# Patient Record
Sex: MALE | Race: BLACK OR AFRICAN AMERICAN | NOT HISPANIC OR LATINO | Employment: OTHER | URBAN - METROPOLITAN AREA
[De-identification: names, ages, dates, MRNs, and addresses within clinical notes are randomized per-mention and may not be internally consistent; named-entity substitution may affect disease eponyms.]

---

## 2022-11-05 ENCOUNTER — HOSPITAL ENCOUNTER (EMERGENCY)
Facility: OTHER | Age: 67
Discharge: HOME | End: 2022-11-05
Attending: EMERGENCY MEDICINE
Payer: MEDICARE

## 2022-11-05 VITALS
TEMPERATURE: 99 F | HEART RATE: 70 BPM | DIASTOLIC BLOOD PRESSURE: 80 MMHG | RESPIRATION RATE: 16 BRPM | OXYGEN SATURATION: 100 % | SYSTOLIC BLOOD PRESSURE: 168 MMHG

## 2022-11-05 DIAGNOSIS — N20.0 KIDNEY STONE: Primary | ICD-10-CM

## 2022-11-05 DIAGNOSIS — I10 HYPERTENSION, UNSPECIFIED TYPE: ICD-10-CM

## 2022-11-05 DIAGNOSIS — R10.9 FLANK PAIN: ICD-10-CM

## 2022-11-05 DIAGNOSIS — K76.0 FATTY LIVER: ICD-10-CM

## 2022-11-05 DIAGNOSIS — K40.90 INGUINAL HERNIA WITHOUT OBSTRUCTION OR GANGRENE, RECURRENCE NOT SPECIFIED, UNSPECIFIED LATERALITY: ICD-10-CM

## 2022-11-05 DIAGNOSIS — N28.1 RENAL CYST: ICD-10-CM

## 2022-11-05 LAB
ALBUMIN SERPL BCP-MCNC: 3.7 G/DL (ref 3.5–5.2)
ALP SERPL-CCNC: 135 U/L (ref 55–135)
ALT SERPL W/O P-5'-P-CCNC: 29 U/L (ref 10–44)
ANION GAP SERPL CALC-SCNC: 7 MMOL/L (ref 8–16)
AST SERPL-CCNC: 32 U/L (ref 10–40)
BASOPHILS # BLD AUTO: 0.05 K/UL (ref 0–0.2)
BASOPHILS NFR BLD: 0.6 % (ref 0–1.9)
BILIRUB SERPL-MCNC: 0.5 MG/DL (ref 0.1–1)
BILIRUB UR QL STRIP: NEGATIVE
BUN SERPL-MCNC: 13 MG/DL (ref 8–23)
CALCIUM SERPL-MCNC: 9.6 MG/DL (ref 8.7–10.5)
CHLORIDE SERPL-SCNC: 113 MMOL/L (ref 95–110)
CLARITY UR: CLEAR
CO2 SERPL-SCNC: 19 MMOL/L (ref 23–29)
COLOR UR: YELLOW
CREAT SERPL-MCNC: 1.4 MG/DL (ref 0.5–1.4)
DIFFERENTIAL METHOD: ABNORMAL
EOSINOPHIL # BLD AUTO: 0.1 K/UL (ref 0–0.5)
EOSINOPHIL NFR BLD: 0.7 % (ref 0–8)
ERYTHROCYTE [DISTWIDTH] IN BLOOD BY AUTOMATED COUNT: 14.5 % (ref 11.5–14.5)
EST. GFR  (NO RACE VARIABLE): 55 ML/MIN/1.73 M^2
GLUCOSE SERPL-MCNC: 94 MG/DL (ref 70–110)
GLUCOSE UR QL STRIP: NEGATIVE
HCT VFR BLD AUTO: 39.8 % (ref 40–54)
HCV AB SERPL QL IA: NEGATIVE
HGB BLD-MCNC: 13.1 G/DL (ref 14–18)
HGB UR QL STRIP: ABNORMAL
HIV 1+2 AB+HIV1 P24 AG SERPL QL IA: NEGATIVE
IMM GRANULOCYTES # BLD AUTO: 0.02 K/UL (ref 0–0.04)
IMM GRANULOCYTES NFR BLD AUTO: 0.2 % (ref 0–0.5)
KETONES UR QL STRIP: ABNORMAL
LEUKOCYTE ESTERASE UR QL STRIP: NEGATIVE
LYMPHOCYTES # BLD AUTO: 1.2 K/UL (ref 1–4.8)
LYMPHOCYTES NFR BLD: 14.7 % (ref 18–48)
MCH RBC QN AUTO: 30.2 PG (ref 27–31)
MCHC RBC AUTO-ENTMCNC: 32.9 G/DL (ref 32–36)
MCV RBC AUTO: 92 FL (ref 82–98)
MICROSCOPIC COMMENT: ABNORMAL
MONOCYTES # BLD AUTO: 0.5 K/UL (ref 0.3–1)
MONOCYTES NFR BLD: 6.2 % (ref 4–15)
NEUTROPHILS # BLD AUTO: 6.3 K/UL (ref 1.8–7.7)
NEUTROPHILS NFR BLD: 77.6 % (ref 38–73)
NITRITE UR QL STRIP: NEGATIVE
NRBC BLD-RTO: 0 /100 WBC
PH UR STRIP: 7 [PH] (ref 5–8)
PLATELET # BLD AUTO: 198 K/UL (ref 150–450)
PMV BLD AUTO: 9.8 FL (ref 9.2–12.9)
POTASSIUM SERPL-SCNC: 4.1 MMOL/L (ref 3.5–5.1)
PROT SERPL-MCNC: 7 G/DL (ref 6–8.4)
PROT UR QL STRIP: ABNORMAL
RBC # BLD AUTO: 4.34 M/UL (ref 4.6–6.2)
RBC #/AREA URNS HPF: 50 /HPF (ref 0–4)
SODIUM SERPL-SCNC: 139 MMOL/L (ref 136–145)
SP GR UR STRIP: 1.02 (ref 1–1.03)
URN SPEC COLLECT METH UR: ABNORMAL
UROBILINOGEN UR STRIP-ACNC: NEGATIVE EU/DL
WBC # BLD AUTO: 8.09 K/UL (ref 3.9–12.7)

## 2022-11-05 PROCEDURE — 81000 URINALYSIS NONAUTO W/SCOPE: CPT | Performed by: PHYSICIAN ASSISTANT

## 2022-11-05 PROCEDURE — 96375 TX/PRO/DX INJ NEW DRUG ADDON: CPT

## 2022-11-05 PROCEDURE — 63600175 PHARM REV CODE 636 W HCPCS: Performed by: PHYSICIAN ASSISTANT

## 2022-11-05 PROCEDURE — 25000003 PHARM REV CODE 250: Performed by: PHYSICIAN ASSISTANT

## 2022-11-05 PROCEDURE — 85025 COMPLETE CBC W/AUTO DIFF WBC: CPT | Performed by: PHYSICIAN ASSISTANT

## 2022-11-05 PROCEDURE — 99285 EMERGENCY DEPT VISIT HI MDM: CPT | Mod: 25

## 2022-11-05 PROCEDURE — 87389 HIV-1 AG W/HIV-1&-2 AB AG IA: CPT | Performed by: EMERGENCY MEDICINE

## 2022-11-05 PROCEDURE — 96361 HYDRATE IV INFUSION ADD-ON: CPT

## 2022-11-05 PROCEDURE — 80053 COMPREHEN METABOLIC PANEL: CPT | Performed by: EMERGENCY MEDICINE

## 2022-11-05 PROCEDURE — 96376 TX/PRO/DX INJ SAME DRUG ADON: CPT

## 2022-11-05 PROCEDURE — 96374 THER/PROPH/DIAG INJ IV PUSH: CPT

## 2022-11-05 PROCEDURE — 86803 HEPATITIS C AB TEST: CPT | Performed by: EMERGENCY MEDICINE

## 2022-11-05 RX ORDER — TAMSULOSIN HYDROCHLORIDE 0.4 MG/1
0.4 CAPSULE ORAL DAILY
Qty: 7 CAPSULE | Refills: 0 | Status: SHIPPED | OUTPATIENT
Start: 2022-11-05 | End: 2022-11-12

## 2022-11-05 RX ORDER — MORPHINE SULFATE 4 MG/ML
4 INJECTION, SOLUTION INTRAMUSCULAR; INTRAVENOUS
Status: COMPLETED | OUTPATIENT
Start: 2022-11-05 | End: 2022-11-05

## 2022-11-05 RX ORDER — TAMSULOSIN HYDROCHLORIDE 0.4 MG/1
0.4 CAPSULE ORAL
Status: COMPLETED | OUTPATIENT
Start: 2022-11-05 | End: 2022-11-05

## 2022-11-05 RX ORDER — ACETAMINOPHEN 325 MG/1
650 TABLET ORAL
Status: COMPLETED | OUTPATIENT
Start: 2022-11-05 | End: 2022-11-05

## 2022-11-05 RX ORDER — NAPROXEN 375 MG/1
375 TABLET ORAL 2 TIMES DAILY WITH MEALS
Qty: 14 TABLET | Refills: 0 | Status: SHIPPED | OUTPATIENT
Start: 2022-11-05

## 2022-11-05 RX ORDER — KETOROLAC TROMETHAMINE 30 MG/ML
10 INJECTION, SOLUTION INTRAMUSCULAR; INTRAVENOUS
Status: COMPLETED | OUTPATIENT
Start: 2022-11-05 | End: 2022-11-05

## 2022-11-05 RX ORDER — LIDOCAINE 50 MG/G
1 PATCH TOPICAL
Status: DISCONTINUED | OUTPATIENT
Start: 2022-11-05 | End: 2022-11-05 | Stop reason: HOSPADM

## 2022-11-05 RX ORDER — LIDOCAINE 50 MG/G
1 PATCH TOPICAL DAILY
Qty: 10 PATCH | Refills: 0 | Status: SHIPPED | OUTPATIENT
Start: 2022-11-05

## 2022-11-05 RX ORDER — HYDROCODONE BITARTRATE AND ACETAMINOPHEN 7.5; 325 MG/1; MG/1
1 TABLET ORAL
Status: COMPLETED | OUTPATIENT
Start: 2022-11-05 | End: 2022-11-05

## 2022-11-05 RX ORDER — ONDANSETRON 4 MG/1
4 TABLET, ORALLY DISINTEGRATING ORAL EVERY 6 HOURS PRN
Qty: 16 TABLET | Refills: 0 | Status: SHIPPED | OUTPATIENT
Start: 2022-11-05

## 2022-11-05 RX ORDER — ACETAMINOPHEN 500 MG
1000 TABLET ORAL
Status: DISCONTINUED | OUTPATIENT
Start: 2022-11-05 | End: 2022-11-05

## 2022-11-05 RX ORDER — HYDROCODONE BITARTRATE AND ACETAMINOPHEN 7.5; 325 MG/1; MG/1
1 TABLET ORAL EVERY 4 HOURS PRN
Qty: 18 TABLET | Refills: 0 | Status: SHIPPED | OUTPATIENT
Start: 2022-11-05 | End: 2022-11-08

## 2022-11-05 RX ADMIN — HYDROCODONE BITARTRATE AND ACETAMINOPHEN 1 TABLET: 7.5; 325 TABLET ORAL at 07:11

## 2022-11-05 RX ADMIN — SODIUM CHLORIDE 1000 ML: 0.9 INJECTION, SOLUTION INTRAVENOUS at 04:11

## 2022-11-05 RX ADMIN — ACETAMINOPHEN 650 MG: 325 TABLET, FILM COATED ORAL at 07:11

## 2022-11-05 RX ADMIN — MORPHINE SULFATE 4 MG: 4 INJECTION, SOLUTION INTRAMUSCULAR; INTRAVENOUS at 07:11

## 2022-11-05 RX ADMIN — LIDOCAINE 1 PATCH: 50 PATCH TOPICAL at 07:11

## 2022-11-05 RX ADMIN — KETOROLAC TROMETHAMINE 10 MG: 30 INJECTION, SOLUTION INTRAMUSCULAR; INTRAVENOUS at 07:11

## 2022-11-05 RX ADMIN — TAMSULOSIN HYDROCHLORIDE 0.4 MG: 0.4 CAPSULE ORAL at 07:11

## 2022-11-05 RX ADMIN — MORPHINE SULFATE 4 MG: 4 INJECTION, SOLUTION INTRAMUSCULAR; INTRAVENOUS at 04:11

## 2022-11-05 NOTE — ED TRIAGE NOTES
"68 yo male reports to ed with co RUQ abd pain radiating to R flank area. When asked when this started the pt stated "20 years ago". Reports N/V this am. Aaox4.   "
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
## 2022-11-06 NOTE — DISCHARGE INSTRUCTIONS
Use all medication as prescribed (Add 500mg tylenol every 6 hours if needed for increased pain control- do not take more tylenol than this recommendation), urology clinic will call you to schedule  Drink plenty of clear fluids   Return to ER for uncontrollable vomiting, severe pain despite pain meds or fever

## 2022-11-15 ENCOUNTER — TELEPHONE (OUTPATIENT)
Dept: UROLOGY | Facility: CLINIC | Age: 67
End: 2022-11-15
Payer: MEDICAID

## 2022-11-15 ENCOUNTER — NURSE TRIAGE (OUTPATIENT)
Dept: ADMINISTRATIVE | Facility: CLINIC | Age: 67
End: 2022-11-15
Payer: MEDICAID

## 2022-11-15 NOTE — TELEPHONE ENCOUNTER
Call placed to number listed for patient. Phone line gave a busy signal. Patient has not been seen by this provider and medications cannot be prescribed without a prior establishing clinic encounter.

## 2022-11-15 NOTE — TELEPHONE ENCOUNTER
Pt states out of rx, last dose on Saturday, for kidney stone. Pt asking to refill medications, states he has not passed the stone. Per pt chart, will not see urology until January 4. Advised per care advice discuss with pcp and callback by nurse today. Discussed with pt that if symptoms change or worsen to call back or for any further questions or concerns. Pt verbalizes understanding.   Reason for Disposition   Prescription refill request for NON-ESSENTIAL medicine (i.e., no harm to patient if med not taken) and triager unable to refill per department policy    Protocols used: Medication Refill and Renewal Call-A-OH

## 2022-11-15 NOTE — TELEPHONE ENCOUNTER
----- Message from Shiloh Fuchs sent at 11/15/2022  4:32 PM CST -----  Name of Who is Calling:TONY HOU [[id number]]              What is the request in detail:Requesting a call back regarding medication.               Can the clinic reply by [facility]:no              [facility][phone]

## 2023-09-07 ENCOUNTER — TELEPHONE (OUTPATIENT)
Dept: SPINE | Facility: CLINIC | Age: 68
End: 2023-09-07
Payer: MEDICARE

## 2023-09-07 NOTE — TELEPHONE ENCOUNTER
----- Message from Fernanda Holt sent at 9/7/2023  4:46 PM CDT -----  Regarding: self [phone]    Type: Patient Call Back       Who called: Patient       What is the request in detail: Patient would like to know why the provider canceled his appt. Patient states he has medicaid and peoples health insurance. Thank you       Can the clinic reply by [facility]? no       Would the patient rather a call back or a response via [facility]? Call back       Best call back number: [phone]       Additional Information:

## 2023-09-07 NOTE — TELEPHONE ENCOUNTER
Another member of staff called and left patient a message informing him of the cancellation and the reason as to why.

## 2023-10-06 ENCOUNTER — OFFICE VISIT (OUTPATIENT)
Dept: PAIN MEDICINE | Facility: CLINIC | Age: 68
End: 2023-10-06
Payer: MEDICARE

## 2023-10-06 VITALS
RESPIRATION RATE: 18 BRPM | TEMPERATURE: 99 F | SYSTOLIC BLOOD PRESSURE: 162 MMHG | HEART RATE: 82 BPM | WEIGHT: 207.69 LBS | OXYGEN SATURATION: 100 % | DIASTOLIC BLOOD PRESSURE: 83 MMHG

## 2023-10-06 DIAGNOSIS — M79.604 LEG PAIN, ANTERIOR, RIGHT: Primary | ICD-10-CM

## 2023-10-06 DIAGNOSIS — F11.90 CHRONIC, CONTINUOUS USE OF OPIOIDS: ICD-10-CM

## 2023-10-06 DIAGNOSIS — M54.16 LUMBAR RADICULOPATHY, CHRONIC: ICD-10-CM

## 2023-10-06 PROCEDURE — 4010F ACE/ARB THERAPY RXD/TAKEN: CPT | Mod: CPTII,S$GLB,, | Performed by: ANESTHESIOLOGY

## 2023-10-06 PROCEDURE — 3288F FALL RISK ASSESSMENT DOCD: CPT | Mod: CPTII,S$GLB,, | Performed by: ANESTHESIOLOGY

## 2023-10-06 PROCEDURE — 1159F MED LIST DOCD IN RCRD: CPT | Mod: CPTII,S$GLB,, | Performed by: ANESTHESIOLOGY

## 2023-10-06 PROCEDURE — 3079F PR MOST RECENT DIASTOLIC BLOOD PRESSURE 80-89 MM HG: ICD-10-PCS | Mod: CPTII,S$GLB,, | Performed by: ANESTHESIOLOGY

## 2023-10-06 PROCEDURE — 1159F PR MEDICATION LIST DOCUMENTED IN MEDICAL RECORD: ICD-10-PCS | Mod: CPTII,S$GLB,, | Performed by: ANESTHESIOLOGY

## 2023-10-06 PROCEDURE — 99999 PR PBB SHADOW E&M-EST. PATIENT-LVL III: ICD-10-PCS | Mod: PBBFAC,,, | Performed by: ANESTHESIOLOGY

## 2023-10-06 PROCEDURE — 99204 OFFICE O/P NEW MOD 45 MIN: CPT | Mod: S$GLB,,, | Performed by: ANESTHESIOLOGY

## 2023-10-06 PROCEDURE — 1100F PTFALLS ASSESS-DOCD GE2>/YR: CPT | Mod: CPTII,S$GLB,, | Performed by: ANESTHESIOLOGY

## 2023-10-06 PROCEDURE — 1125F PR PAIN SEVERITY QUANTIFIED, PAIN PRESENT: ICD-10-PCS | Mod: CPTII,S$GLB,, | Performed by: ANESTHESIOLOGY

## 2023-10-06 PROCEDURE — 3077F SYST BP >= 140 MM HG: CPT | Mod: CPTII,S$GLB,, | Performed by: ANESTHESIOLOGY

## 2023-10-06 PROCEDURE — 4010F PR ACE/ARB THEARPY RXD/TAKEN: ICD-10-PCS | Mod: CPTII,S$GLB,, | Performed by: ANESTHESIOLOGY

## 2023-10-06 PROCEDURE — 1100F PR PT FALLS ASSESS DOC 2+ FALLS/FALL W/INJURY/YR: ICD-10-PCS | Mod: CPTII,S$GLB,, | Performed by: ANESTHESIOLOGY

## 2023-10-06 PROCEDURE — 3077F PR MOST RECENT SYSTOLIC BLOOD PRESSURE >= 140 MM HG: ICD-10-PCS | Mod: CPTII,S$GLB,, | Performed by: ANESTHESIOLOGY

## 2023-10-06 PROCEDURE — 3079F DIAST BP 80-89 MM HG: CPT | Mod: CPTII,S$GLB,, | Performed by: ANESTHESIOLOGY

## 2023-10-06 PROCEDURE — 1125F AMNT PAIN NOTED PAIN PRSNT: CPT | Mod: CPTII,S$GLB,, | Performed by: ANESTHESIOLOGY

## 2023-10-06 PROCEDURE — 3288F PR FALLS RISK ASSESSMENT DOCUMENTED: ICD-10-PCS | Mod: CPTII,S$GLB,, | Performed by: ANESTHESIOLOGY

## 2023-10-06 PROCEDURE — 99999 PR PBB SHADOW E&M-EST. PATIENT-LVL III: CPT | Mod: PBBFAC,,, | Performed by: ANESTHESIOLOGY

## 2023-10-06 PROCEDURE — 99204 PR OFFICE/OUTPT VISIT, NEW, LEVL IV, 45-59 MIN: ICD-10-PCS | Mod: S$GLB,,, | Performed by: ANESTHESIOLOGY

## 2023-10-06 RX ORDER — TRAMADOL HYDROCHLORIDE 50 MG/1
50 TABLET ORAL
Qty: 30 TABLET | Refills: 0 | Status: SHIPPED | OUTPATIENT
Start: 2023-10-06 | End: 2024-01-23 | Stop reason: SDUPTHER

## 2023-10-06 RX ORDER — PREGABALIN 50 MG/1
50 CAPSULE ORAL 2 TIMES DAILY
Qty: 60 CAPSULE | Refills: 2 | Status: SHIPPED | OUTPATIENT
Start: 2023-10-06
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
## 2023-10-20 ENCOUNTER — HOSPITAL ENCOUNTER (OUTPATIENT)
Dept: RADIOLOGY | Facility: OTHER | Age: 68
Discharge: HOME | End: 2023-10-20
Attending: ANESTHESIOLOGY
Payer: MEDICARE

## 2023-10-20 DIAGNOSIS — M54.16 LUMBAR RADICULOPATHY, CHRONIC: ICD-10-CM

## 2023-10-20 DIAGNOSIS — M79.604 LEG PAIN, ANTERIOR, RIGHT: ICD-10-CM

## 2023-10-20 PROCEDURE — 72148 MRI LUMBAR SPINE W/O DYE: CPT | Mod: TC

## 2023-10-20 PROCEDURE — 72148 MRI LUMBAR SPINE W/O DYE: CPT | Mod: 26,,, | Performed by: RADIOLOGY

## 2023-10-20 PROCEDURE — 72148 MRI LUMBAR SPINE WITHOUT CONTRAST: ICD-10-PCS | Mod: 26,,, | Performed by: RADIOLOGY

## 2023-10-20 PROCEDURE — 73562 X-RAY EXAM OF KNEE 3: CPT | Mod: 26,RT,, | Performed by: RADIOLOGY

## 2023-10-20 PROCEDURE — 73562 X-RAY EXAM OF KNEE 3: CPT | Mod: TC,FY,RT

## 2023-10-20 PROCEDURE — 73562 XR KNEE 3 VIEW RIGHT: ICD-10-PCS | Mod: 26,RT,, | Performed by: RADIOLOGY

## 2024-01-23 ENCOUNTER — OFFICE VISIT (OUTPATIENT)
Dept: PAIN MEDICINE | Facility: CLINIC | Age: 69
End: 2024-01-23
Payer: MEDICARE

## 2024-01-23 VITALS
HEART RATE: 82 BPM | SYSTOLIC BLOOD PRESSURE: 155 MMHG | OXYGEN SATURATION: 100 % | HEIGHT: 68 IN | TEMPERATURE: 98 F | DIASTOLIC BLOOD PRESSURE: 100 MMHG | BODY MASS INDEX: 32.41 KG/M2 | RESPIRATION RATE: 18 BRPM | WEIGHT: 213.88 LBS

## 2024-01-23 DIAGNOSIS — M25.512 CHRONIC LEFT SHOULDER PAIN: ICD-10-CM

## 2024-01-23 DIAGNOSIS — M54.2 CERVICALGIA: ICD-10-CM

## 2024-01-23 DIAGNOSIS — Z79.891 ENCOUNTER FOR LONG-TERM OPIATE ANALGESIC USE: ICD-10-CM

## 2024-01-23 DIAGNOSIS — F11.90 CHRONIC, CONTINUOUS USE OF OPIOIDS: ICD-10-CM

## 2024-01-23 DIAGNOSIS — M54.12 CERVICAL RADICULOPATHY: ICD-10-CM

## 2024-01-23 DIAGNOSIS — M54.16 LUMBAR RADICULOPATHY, CHRONIC: Primary | ICD-10-CM

## 2024-01-23 DIAGNOSIS — G89.29 CHRONIC LEFT SHOULDER PAIN: ICD-10-CM

## 2024-01-23 DIAGNOSIS — M79.604 LEG PAIN, ANTERIOR, RIGHT: ICD-10-CM

## 2024-01-23 PROCEDURE — 80347 BENZODIAZEPINES 13 OR MORE: CPT | Performed by: NURSE PRACTITIONER

## 2024-01-23 PROCEDURE — 80326 AMPHETAMINES 5 OR MORE: CPT | Performed by: NURSE PRACTITIONER

## 2024-01-23 PROCEDURE — 99214 OFFICE O/P EST MOD 30 MIN: CPT | Mod: S$GLB,,, | Performed by: NURSE PRACTITIONER

## 2024-01-23 PROCEDURE — 80355 GABAPENTIN NON-BLOOD: CPT | Performed by: NURSE PRACTITIONER

## 2024-01-23 PROCEDURE — 99999 PR PBB SHADOW E&M-EST. PATIENT-LVL IV: CPT | Mod: PBBFAC,,, | Performed by: NURSE PRACTITIONER

## 2024-01-23 RX ORDER — TRAMADOL HYDROCHLORIDE 50 MG/1
50 TABLET ORAL
Qty: 30 TABLET | Refills: 1 | Status: SHIPPED | OUTPATIENT
Start: 2024-01-23 | End: 2024-03-05 | Stop reason: SDUPTHER

## 2024-01-23 RX ORDER — DICLOFENAC SODIUM 10 MG/G
2 GEL TOPICAL DAILY
Qty: 1 EACH | Refills: 2 | Status: SHIPPED | OUTPATIENT
Start: 2024-01-23
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
## 2024-01-28 LAB
6MAM UR QL: NOT DETECTED
7AMINOCLONAZEPAM UR QL: NOT DETECTED
A-OH ALPRAZ UR QL: NOT DETECTED
ALPHA-OH-MIDAZOLAM: NOT DETECTED
ALPRAZ UR QL: NOT DETECTED
AMPHET UR QL SCN: NOT DETECTED
ANNOTATION COMMENT IMP: NORMAL
BARBITURATES UR QL: NEGATIVE
BUPRENORPHINE UR QL: NOT DETECTED
BZE UR QL: NEGATIVE
CARBOXYTHC UR QL: NEGATIVE
CARISOPRODOL UR QL: NEGATIVE
CLONAZEPAM UR QL: NOT DETECTED
CODEINE UR QL: NOT DETECTED
CREAT UR-MCNC: 186.5 MG/DL (ref 20–400)
DIAZEPAM UR QL: NOT DETECTED
ETHYL GLUCURONIDE UR QL: NEGATIVE
FENTANYL UR QL: NOT DETECTED
GABAPENTIN: NOT DETECTED
HYDROCODONE UR QL: NOT DETECTED
HYDROMORPHONE UR QL: NOT DETECTED
LORAZEPAM UR QL: NOT DETECTED
MDA UR QL: NOT DETECTED
MDEA UR QL: NOT DETECTED
MDMA UR QL: NOT DETECTED
ME-PHENIDATE UR QL: NOT DETECTED
METHADONE UR QL: NEGATIVE
METHAMPHET UR QL: NOT DETECTED
MIDAZOLAM UR QL SCN: NOT DETECTED
MORPHINE UR QL: NOT DETECTED
NALOXONE: NOT DETECTED
NORBUPRENORPHINE UR QL CFM: NOT DETECTED
NORDIAZEPAM UR QL: NOT DETECTED
NORFENTANYL UR QL: NOT DETECTED
NORHYDROCODONE UR QL CFM: NOT DETECTED
NORMEPERIDINE UR QL CFM: NOT DETECTED
NOROXYCODONE UR QL CFM: NOT DETECTED
NOROXYMORPHONE UR QL SCN: NOT DETECTED
OXAZEPAM UR QL: NOT DETECTED
OXYCODONE UR QL: NOT DETECTED
OXYMORPHONE UR QL: NOT DETECTED
PATHOLOGY STUDY: NORMAL
PCP UR QL: NEGATIVE
PHENTERMINE UR QL: NOT DETECTED
PREGABALIN: NOT DETECTED
SERVICE CMNT-IMP: NORMAL
TAPENTADOL UR QL SCN: NOT DETECTED
TAPENTADOL UR QL SCN: NOT DETECTED
TEMAZEPAM UR QL: NOT DETECTED
TRAMADOL UR QL: NEGATIVE
ZOLPIDEM METABOLITE: NOT DETECTED
ZOLPIDEM UR QL: NOT DETECTED

## 2024-02-28 ENCOUNTER — TELEPHONE (OUTPATIENT)
Dept: PAIN MEDICINE | Facility: CLINIC | Age: 69
End: 2024-02-28
Payer: MEDICARE

## 2024-02-28 NOTE — TELEPHONE ENCOUNTER
----- Message from Claire Esquivel MA sent at 2/28/2024  2:51 PM CST -----  Regarding: Return Call  Message Type: Return Call           Who Called:TONY HOU [[id number]]              Who Left Message for Patient:Na Mcgee MA                Does the patient know what this is regarding?  no              Best Call Back Number:[phone]               Additional Information: Patient is returning a call.  Please assist.

## 2024-02-28 NOTE — TELEPHONE ENCOUNTER
"----- Message from Arlene Bro sent at 2/27/2024 12:39 PM CST -----  Regarding: Medication  "Type:  Patient Call Back    Who Called:PT    What is the reqeust in detail:Pt requesting call back would like to know if doctor can prescribe him something for his back pain. Please advise    Can the clinic reply by [facility]?no    Best Call Back Number:[phone]      Additional Information:[id number] - [place], [place] - [address]                "

## 2024-03-05 DIAGNOSIS — M79.604 LEG PAIN, ANTERIOR, RIGHT: ICD-10-CM

## 2024-03-05 RX ORDER — TRAMADOL HYDROCHLORIDE 50 MG/1
50 TABLET ORAL
Qty: 30 TABLET | Refills: 1 | Status: CANCELLED | OUTPATIENT
Start: 2024-03-05

## 2024-03-05 NOTE — TELEPHONE ENCOUNTER
Patient requesting refill on tramadol  Last office visit 1/23/2024   shows last refill on 2/23/2024  Patient does have a pain contract on file with [facility]  Patient last UDS 1/23/2024 was consistent with current therapy     CODEINE  Not Detected   Comment: INTERPRETIVE INFORMATION: Codeine, U  Positive Cutoff: 40 ng/mL  Methodology: Mass Spectrometry   MORPHINE  Not Detected   Comment: INTERPRETIVE INFORMATION:Morphine, U  Positive Cutoff: 20 ng/mL  Methodology: Mass Spectrometry   6-ACETYLMORPHINE  Not Detected   Comment: INTERPRETIVE INFORMATION:6-acetylmorphine, U  Positive Cutoff: 20 ng/mL  Methodology: Mass Spectrometry   OXYCODONE  Not Detected   Comment: INTERPRETIVE INFORMATION:Oxycodone, U  Positive Cutoff: 40 ng/mL  Methodology: Mass Spectrometry   NOROYXCODONE  Not Detected   Comment: INTERPRETIVE INFORMATION:Noroxycodone, U  Positive Cutoff: 100 ng/mL  Methodology: Mass Spectrometry   OXYMORPHONE  Not Detected   Comment: INTERPRETIVE INFORMATION:Oxymorphone, U  Positive Cutoff: 40 ng/mL  Methodology: Mass Spectrometry   NOROXYMORPHONE  Not Detected   Comment: INTERPRETIVE INFORMATION:Noroxymorphone, U  Positive Cutoff: 100 ng/mL  Methodology: Mass Spectrometry   HYDROCODONE  Not Detected   Comment: INTERPRETIVE INFORMATION:Hydrocodone, U  Positive Cutoff: 40 ng/mL  Methodology: Mass Spectrometry   NORHYDROCODONE  Not Detected   Comment: INTERPRETIVE INFORMATION:Norhydrocodone, U  Positive Cutoff: 100 ng/mL  Methodology: Mass Spectrometry   HYDROMORPHONE  Not Detected   Comment: INTERPRETIVE INFORMATION:Hydromorphone, U  Positive Cutoff: 20 ng/mL  Methodology: Mass Spectrometry   BUPRENORPHINE  Not Detected   Comment: INTERPRETIVE INFORMATION:Buprenorphine, U  Positive Cutoff: 5 ng/mL  Methodology: Mass Spectrometry   NORUBPRENORPHINE  Not Detected   Comment: INTERPRETIVE INFORMATION:Norbuprenorphine, U  Positive Cutoff: 20 ng/mL  Methodology: Mass Spectrometry    FENTANYL  Not Detected   Comment: INTERPRETIVE INFORMATION:Fentanyl, U  Positive Cutoff: 2 ng/mL  Methodology: Mass Spectrometry   NORFENTANYL  Not Detected   Comment: INTERPRETIVE INFORMATION:Norfentanyl, U  Positive Cutoff: 2 ng/mL  Methodology: Mass Spectrometry   MEPERIDINE METABOLITE  Not Detected   Comment: INTERPRETIVE INFORMATION:Meperidine metabolite, U  Positive Cutoff: 50 ng/mL  Methodology: Mass Spectrometry   TAPENTADOL  Not Detected   Comment: INTERPRETIVE INFORMATION:Tapentadol, U  Positive Cutoff: 100 ng/mL  Methodology: Mass Spectrometry   TAPENTADOL-O-SULF  Not Detected   Comment: INTERPRETIVE INFORMATION:Tapentadol-o-Sulf, U  Positive Cutoff: 200 ng/mL  Methodology: Mass Spectrometry   METHADONE  Negative   Comment: Presumptive negative by immunoassay. Testing by mass  spectrometry is available on request.  INTERPRETIVE INFORMATION: Methadone Screen, U  Positive Cutoff: 150 ng/mL  Methodology: Immunoassay   TRAMADOL  Negative   Comment: Presumptive negative by immunoassay. Testing by mass  spectrometry is available on request.  INTERPRETIVE INFORMATION:Tramadol Screen, U  Positive Cutoff: 100 ng/mL  Methodology: Immunoassay   AMPHETAMINE  Not Detected   Comment: INTERPRETIVE INFORMATION:Amphetamine, U  Positive Cutoff: 50 ng/mL  Methodology: Mass Spectrometry   METHAMPHETAMINE  Not Detected   Comment: INTERPRETIVE INFORMATION:Methamphetamine, U  Positive Cutoff: 200 ng/mL  Methodology: Mass Spectrometry   MDMA- ECSTASY  Not Detected   Comment: INTERPRETIVE INFORMATION:MDMA, U  Positive Cutoff: 200 ng/mL  Methodology: Mass Spectrometry   MDA  Not Detected   Comment: INTERPRETIVE INFORMATION:MDA, U  Positive Cutoff: 200 ng/mL  Methodology: Mass Spectrometry   MDEA- [place]  Not Detected   Comment: INTERPRETIVE INFORMATION:MDEA, U  Positive Cutoff: 200 ng/mL  Methodology: Mass Spectrometry   METHYLPHENIDATE  Not Detected   Comment: INTERPRETIVE INFORMATION:Methylphenidate, U  Positive Cutoff: 100  ng/mL  Methodology: Mass Spectrometry   PHENTERMINE  Not Detected   Comment: INTERPRETIVE INFORMATION:Phentermine, U  Positive Cutoff: 100 ng/mL  Methodology: Mass Spectrometry   BENZOYLECGONINE  Negative   Comment: Presumptive negative by immunoassay. Testing by mass  spectrometry is available on request.  INTERPRETIVE INFORMATION:Cocaine Screen, U  Positive Cutoff: 150 ng/mL  Methodology: Immunoassay   ALPRAZOLAM  Not Detected   Comment: INTERPRETIVE INFORMATION:Alprazolam, U  Positive Cutoff: 40 ng/mL  Methodology: Mass Spectrometry   ALPHA-OH-ALPRAZOLAM  Not Detected   Comment: INTERPRETIVE INFORMATION:Alpha-OH-Alprazolam, U  Positive Cutoff: 20 ng/mL  Methodology: Mass Spectrometry   CLONAZEPAM  Not Detected   Comment: INTERPRETIVE INFORMATION:Clonazepam, U  Positive Cutoff: 20 ng/mL  Methodology: Mass Spectrometry   7-AMINOCLONAZEPAM  Not Detected   Comment: INTERPRETIVE INFORMATION:7-Aminoclonazepam, U  Positive Cutoff: 40 ng/mL  Methodology: Mass Spectrometry   DIAZEPAM  Not Detected   Comment: INTERPRETIVE INFORMATION:Diazepam, U  Positive Cutoff: 50 ng/mL  Methodology: Mass Spectrometry   NORDIAZEPAM  Not Detected   Comment: INTERPRETIVE INFORMATION:Nordiazepam, U  Positive Cutoff: 50 ng/mL  Methodology: Mass Spectrometry   OXAZEPAM  Not Detected   Comment: INTERPRETIVE INFORMATION:Oxazepam, U  Positive Cutoff: 50 ng/mL  Methodology: Mass Spectrometry   TEMAZEPAM  Not Detected   Comment: INTERPRETIVE INFORMATION:Temazepam, U  Positive Cutoff: 50 ng/mL  Methodology: Mass Spectrometry

## 2024-03-06 RX ORDER — TRAMADOL HYDROCHLORIDE 50 MG/1
50 TABLET ORAL
Qty: 30 TABLET | Refills: 1 | Status: SHIPPED | OUTPATIENT
Start: 2024-03-06 | End: 2024-03-27 | Stop reason: SDUPTHER

## 2024-03-27 DIAGNOSIS — M79.604 LEG PAIN, ANTERIOR, RIGHT: ICD-10-CM

## 2024-03-27 RX ORDER — TRAMADOL HYDROCHLORIDE 50 MG/1
50 TABLET ORAL
Qty: 30 TABLET | Refills: 1 | Status: SHIPPED | OUTPATIENT
Start: 2024-03-27 | End: 2024-04-09 | Stop reason: SDUPTHER

## 2024-03-27 NOTE — TELEPHONE ENCOUNTER
Patient requesting refill on tramadol  Last office visit 1/23/2024   shows last refill on 2/23/2024  Patient does have a pain contract on file with [facility]  Patient last UDS 1/23/2024 was consistent with current therapy     CODEINE  Not Detected   Comment: INTERPRETIVE INFORMATION: Codeine, U  Positive Cutoff: 40 ng/mL  Methodology: Mass Spectrometry   MORPHINE  Not Detected   Comment: INTERPRETIVE INFORMATION:Morphine, U  Positive Cutoff: 20 ng/mL  Methodology: Mass Spectrometry   6-ACETYLMORPHINE  Not Detected   Comment: INTERPRETIVE INFORMATION:6-acetylmorphine, U  Positive Cutoff: 20 ng/mL  Methodology: Mass Spectrometry   OXYCODONE  Not Detected   Comment: INTERPRETIVE INFORMATION:Oxycodone, U  Positive Cutoff: 40 ng/mL  Methodology: Mass Spectrometry   NOROYXCODONE  Not Detected   Comment: INTERPRETIVE INFORMATION:Noroxycodone, U  Positive Cutoff: 100 ng/mL  Methodology: Mass Spectrometry   OXYMORPHONE  Not Detected   Comment: INTERPRETIVE INFORMATION:Oxymorphone, U  Positive Cutoff: 40 ng/mL  Methodology: Mass Spectrometry   NOROXYMORPHONE  Not Detected   Comment: INTERPRETIVE INFORMATION:Noroxymorphone, U  Positive Cutoff: 100 ng/mL  Methodology: Mass Spectrometry   HYDROCODONE  Not Detected   Comment: INTERPRETIVE INFORMATION:Hydrocodone, U  Positive Cutoff: 40 ng/mL  Methodology: Mass Spectrometry   NORHYDROCODONE  Not Detected   Comment: INTERPRETIVE INFORMATION:Norhydrocodone, U  Positive Cutoff: 100 ng/mL  Methodology: Mass Spectrometry   HYDROMORPHONE  Not Detected   Comment: INTERPRETIVE INFORMATION:Hydromorphone, U  Positive Cutoff: 20 ng/mL  Methodology: Mass Spectrometry   BUPRENORPHINE  Not Detected   Comment: INTERPRETIVE INFORMATION:Buprenorphine, U  Positive Cutoff: 5 ng/mL  Methodology: Mass Spectrometry   NORUBPRENORPHINE  Not Detected   Comment: INTERPRETIVE INFORMATION:Norbuprenorphine, U  Positive Cutoff: 20 ng/mL  Methodology: Mass Spectrometry    FENTANYL  Not Detected   Comment: INTERPRETIVE INFORMATION:Fentanyl, U  Positive Cutoff: 2 ng/mL  Methodology: Mass Spectrometry   NORFENTANYL  Not Detected   Comment: INTERPRETIVE INFORMATION:Norfentanyl, U  Positive Cutoff: 2 ng/mL  Methodology: Mass Spectrometry   MEPERIDINE METABOLITE  Not Detected   Comment: INTERPRETIVE INFORMATION:Meperidine metabolite, U  Positive Cutoff: 50 ng/mL  Methodology: Mass Spectrometry   TAPENTADOL  Not Detected   Comment: INTERPRETIVE INFORMATION:Tapentadol, U  Positive Cutoff: 100 ng/mL  Methodology: Mass Spectrometry   TAPENTADOL-O-SULF  Not Detected   Comment: INTERPRETIVE INFORMATION:Tapentadol-o-Sulf, U  Positive Cutoff: 200 ng/mL  Methodology: Mass Spectrometry   METHADONE  Negative   Comment: Presumptive negative by immunoassay. Testing by mass  spectrometry is available on request.  INTERPRETIVE INFORMATION: Methadone Screen, U  Positive Cutoff: 150 ng/mL  Methodology: Immunoassay   TRAMADOL  Negative   Comment: Presumptive negative by immunoassay. Testing by mass  spectrometry is available on request.  INTERPRETIVE INFORMATION:Tramadol Screen, U  Positive Cutoff: 100 ng/mL  Methodology: Immunoassay   AMPHETAMINE  Not Detected   Comment: INTERPRETIVE INFORMATION:Amphetamine, U  Positive Cutoff: 50 ng/mL  Methodology: Mass Spectrometry   METHAMPHETAMINE  Not Detected   Comment: INTERPRETIVE INFORMATION:Methamphetamine, U  Positive Cutoff: 200 ng/mL  Methodology: Mass Spectrometry   MDMA- ECSTASY  Not Detected   Comment: INTERPRETIVE INFORMATION:MDMA, U  Positive Cutoff: 200 ng/mL  Methodology: Mass Spectrometry   MDA  Not Detected   Comment: INTERPRETIVE INFORMATION:MDA, U  Positive Cutoff: 200 ng/mL  Methodology: Mass Spectrometry   MDEA- [place]  Not Detected   Comment: INTERPRETIVE INFORMATION:MDEA, U  Positive Cutoff: 200 ng/mL  Methodology: Mass Spectrometry   METHYLPHENIDATE  Not Detected   Comment: INTERPRETIVE INFORMATION:Methylphenidate, U  Positive Cutoff: 100  ng/mL  Methodology: Mass Spectrometry   PHENTERMINE  Not Detected   Comment: INTERPRETIVE INFORMATION:Phentermine, U  Positive Cutoff: 100 ng/mL  Methodology: Mass Spectrometry   BENZOYLECGONINE  Negative   Comment: Presumptive negative by immunoassay. Testing by mass  spectrometry is available on request.  INTERPRETIVE INFORMATION:Cocaine Screen, U  Positive Cutoff: 150 ng/mL  Methodology: Immunoassay   ALPRAZOLAM  Not Detected   Comment: INTERPRETIVE INFORMATION:Alprazolam, U  Positive Cutoff: 40 ng/mL  Methodology: Mass Spectrometry   ALPHA-OH-ALPRAZOLAM  Not Detected   Comment: INTERPRETIVE INFORMATION:Alpha-OH-Alprazolam, U  Positive Cutoff: 20 ng/mL  Methodology: Mass Spectrometry   CLONAZEPAM  Not Detected   Comment: INTERPRETIVE INFORMATION:Clonazepam, U  Positive Cutoff: 20 ng/mL  Methodology: Mass Spectrometry   7-AMINOCLONAZEPAM  Not Detected   Comment: INTERPRETIVE INFORMATION:7-Aminoclonazepam, U  Positive Cutoff: 40 ng/mL  Methodology: Mass Spectrometry   DIAZEPAM  Not Detected   Comment: INTERPRETIVE INFORMATION:Diazepam, U  Positive Cutoff: 50 ng/mL  Methodology: Mass Spectrometry   NORDIAZEPAM  Not Detected   Comment: INTERPRETIVE INFORMATION:Nordiazepam, U  Positive Cutoff: 50 ng/mL  Methodology: Mass Spectrometry   OXAZEPAM  Not Detected   Comment: INTERPRETIVE INFORMATION:Oxazepam, U  Positive Cutoff: 50 ng/mL  Methodology: Mass Spectrometry   TEMAZEPAM  Not Detected   Comment: INTERPRETIVE INFORMATION:Temazepam, U  Positive Cutoff: 50 ng/mL  Methodology: Mass Spectrometry   Lorazepam  Not Detected   Comment: INTERPRETIVE INFORMATION:Lorazepam, U  Positive Cutoff: 60 ng/mL  Methodology: Mass Spectrometry   MIDAZOLAM  Not Detected   Comment: INTERPRETIVE INFORMATION:Midazolam, U  Positive Cutoff: 20 ng/mL  Methodology: Mass Spectrometry   ZOLPIDEM  Not Detected   Comment: INTERPRETIVE INFORMATION:Zolpidem, U  Positive Cutoff: 20 ng/mL  Methodology: Mass Spectrometry   BARBITURATES  Negative    Comment: Presumptive negative by immunoassay. Testing by mass  spectrometry is available on request.  INTERPRETIVE INFORMATION:Barbiturates Screen, U  Positive Cutoff: 200 ng/mL  Methodology: Immunoassay   Creatinine, Urine 20.0 - 400.0 mg/dL 186.5   ETHYL GLUCURONIDE  Negative   Comment: Presumptive negative by immunoassay. Testing by mass  spectrometry is available on request.  INTERPRETIVE INFORMATION:Ethyl Glucuronide Screen, U  Positive Cutoff: 500 ng/mL  Methodology: Immunoassay   MARIJUANA METABOLITE  Negative   Comment: Presumptive negative by immunoassay. Testing by mass  spectrometry is available on request.  INTERPRETIVE INFORMATION: THC (Cannabinoids) Screen, U  Positive Cutoff: 50 ng/mL  Methodology: Immunoassay   PCP  Negative   Comment: Presumptive negative by immunoassay. Testing by mass  spectrometry is available on request.  INTERPRETIVE INFORMATION:Phencyclidine Screen, U  Positive Cutoff: 25 ng/mL  Methodology: Immunoassay   CARISOPRODOL  Negative   Comment: Presumptive negative by immunoassay. Testing by mass  spectrometry is available on request.  INTERPRETIVE INFORMATION: Carisoprodol Screen, U  Positive Cutoff: 100 ng/mL  Methodology: Immunoassay  The carisoprodol immunoassay has cross-reactivity to  carisoprodol and meprobamate.   Naloxone  Not Detected   Comment: INTERPRETIVE INFORMATION:Naloxone, U  Positive Cutoff: 100 ng/mL  Methodology: Mass Spectrometry   Gabapentin  Not Detected   Comment: INTERPRETIVE INFORMATION:Gabapentin, U  Positive Cutoff: 3,000 ng/mL  Methodology: Mass Spectrometry   Pregabalin  Not Detected   Comment: INTERPRETIVE INFORMATION:Pregabalin, U  Positive Cutoff: 3,000 ng/mL  Methodology: Mass Spectrometry   Alpha-OH-Midazolam  Not Detected   Comment: INTERPRETIVE INFORMATION:Alpha-OH-Midazolam, U  Positive Cutoff: 20 ng/mL  Methodology: Mass Spectrometry   Zolpidem Metabolite  Not Detected   Comment: INTERPRETIVE INFORMATION:Zolpidem Metabolite, U  Positive  Cutoff: 100 ng/mL  Methodology: Mass Spectrometry   PAIN MANAGEMENT DRUG PANEL  See Below

## 2024-03-27 NOTE — TELEPHONE ENCOUNTER
----- Message from Bárbara Linares sent at 3/27/2024 11:02 AM CDT -----  Regarding: Refil Request  Who Called:TONY HOU [[id number]]      New Prescription or Refill : Refill      RX Name and Strength:  traMADoL (ULTRAM) 50 mg tablet      Local or Mail Order : Local               Preferred Pharmacy:[facility] #[id number] [address]          Would the patient rather a call back or a response via [facility]?no

## 2024-04-08 ENCOUNTER — OFFICE VISIT (OUTPATIENT)
Dept: PAIN MEDICINE | Facility: CLINIC | Age: 69
End: 2024-04-08
Payer: MEDICARE

## 2024-04-08 VITALS
SYSTOLIC BLOOD PRESSURE: 189 MMHG | DIASTOLIC BLOOD PRESSURE: 87 MMHG | WEIGHT: 218.69 LBS | RESPIRATION RATE: 18 BRPM | OXYGEN SATURATION: 98 % | HEART RATE: 60 BPM | TEMPERATURE: 98 F | BODY MASS INDEX: 33.25 KG/M2

## 2024-04-08 DIAGNOSIS — Z79.891 ENCOUNTER FOR LONG-TERM OPIATE ANALGESIC USE: ICD-10-CM

## 2024-04-08 DIAGNOSIS — M54.12 CERVICAL RADICULOPATHY: ICD-10-CM

## 2024-04-08 DIAGNOSIS — M54.16 LUMBAR RADICULOPATHY, CHRONIC: Primary | ICD-10-CM

## 2024-04-08 DIAGNOSIS — M25.512 CHRONIC LEFT SHOULDER PAIN: ICD-10-CM

## 2024-04-08 DIAGNOSIS — G89.29 CHRONIC LEFT SHOULDER PAIN: ICD-10-CM

## 2024-04-08 PROCEDURE — 1101F PT FALLS ASSESS-DOCD LE1/YR: CPT | Mod: CPTII,S$GLB,, | Performed by: NURSE PRACTITIONER

## 2024-04-08 PROCEDURE — 99213 OFFICE O/P EST LOW 20 MIN: CPT | Mod: S$GLB,,, | Performed by: NURSE PRACTITIONER

## 2024-04-08 PROCEDURE — 3288F FALL RISK ASSESSMENT DOCD: CPT | Mod: CPTII,S$GLB,, | Performed by: NURSE PRACTITIONER

## 2024-04-08 PROCEDURE — 3008F BODY MASS INDEX DOCD: CPT | Mod: CPTII,S$GLB,, | Performed by: NURSE PRACTITIONER

## 2024-04-08 PROCEDURE — 3079F DIAST BP 80-89 MM HG: CPT | Mod: CPTII,S$GLB,, | Performed by: NURSE PRACTITIONER

## 2024-04-08 PROCEDURE — 1125F AMNT PAIN NOTED PAIN PRSNT: CPT | Mod: CPTII,S$GLB,, | Performed by: NURSE PRACTITIONER

## 2024-04-08 PROCEDURE — 4010F ACE/ARB THERAPY RXD/TAKEN: CPT | Mod: CPTII,S$GLB,, | Performed by: NURSE PRACTITIONER

## 2024-04-08 PROCEDURE — 1159F MED LIST DOCD IN RCRD: CPT | Mod: CPTII,S$GLB,, | Performed by: NURSE PRACTITIONER

## 2024-04-08 PROCEDURE — 99999 PR PBB SHADOW E&M-EST. PATIENT-LVL III: CPT | Mod: PBBFAC,,, | Performed by: NURSE PRACTITIONER

## 2024-04-08 PROCEDURE — 1160F RVW MEDS BY RX/DR IN RCRD: CPT | Mod: CPTII,S$GLB,, | Performed by: NURSE PRACTITIONER

## 2024-04-08 PROCEDURE — 3077F SYST BP >= 140 MM HG: CPT | Mod: CPTII,S$GLB,, | Performed by: NURSE PRACTITIONER

## 2024-04-08 RX ORDER — NAPROXEN 500 MG/1
500 TABLET ORAL DAILY PRN
Qty: 30 TABLET | Refills: 0 | Status: SHIPPED | OUTPATIENT
Start: 2024-04-08
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
## 2024-04-09 DIAGNOSIS — M79.604 LEG PAIN, ANTERIOR, RIGHT: ICD-10-CM

## 2024-04-09 RX ORDER — TRAMADOL HYDROCHLORIDE 50 MG/1
50 TABLET ORAL
Qty: 30 TABLET | Refills: 1 | Status: SHIPPED | OUTPATIENT
Start: 2024-04-09

## 2024-04-09 NOTE — TELEPHONE ENCOUNTER
Patient requesting refill on traMADoL (ULTRAM) 50 mg   Last office visit 04/08/24   shows last refill on 02/24/24  Patient does not have a pain contract on file with [facility]  Patient last UDS 01/23/24 was not consistent with current therapy       CODEINE  Not Detected   Comment: INTERPRETIVE INFORMATION: Codeine, U  Positive Cutoff: 40 ng/mL  Methodology: Mass Spectrometry   MORPHINE  Not Detected   Comment: INTERPRETIVE INFORMATION:Morphine, U  Positive Cutoff: 20 ng/mL  Methodology: Mass Spectrometry   6-ACETYLMORPHINE  Not Detected   Comment: INTERPRETIVE INFORMATION:6-acetylmorphine, U  Positive Cutoff: 20 ng/mL  Methodology: Mass Spectrometry   OXYCODONE  Not Detected   Comment: INTERPRETIVE INFORMATION:Oxycodone, U  Positive Cutoff: 40 ng/mL  Methodology: Mass Spectrometry   NOROYXCODONE  Not Detected   Comment: INTERPRETIVE INFORMATION:Noroxycodone, U  Positive Cutoff: 100 ng/mL  Methodology: Mass Spectrometry   OXYMORPHONE  Not Detected   Comment: INTERPRETIVE INFORMATION:Oxymorphone, U  Positive Cutoff: 40 ng/mL  Methodology: Mass Spectrometry   NOROXYMORPHONE  Not Detected   Comment: INTERPRETIVE INFORMATION:Noroxymorphone, U  Positive Cutoff: 100 ng/mL  Methodology: Mass Spectrometry   HYDROCODONE  Not Detected   Comment: INTERPRETIVE INFORMATION:Hydrocodone, U  Positive Cutoff: 40 ng/mL  Methodology: Mass Spectrometry   NORHYDROCODONE  Not Detected   Comment: INTERPRETIVE INFORMATION:Norhydrocodone, U  Positive Cutoff: 100 ng/mL  Methodology: Mass Spectrometry   HYDROMORPHONE  Not Detected   Comment: INTERPRETIVE INFORMATION:Hydromorphone, U  Positive Cutoff: 20 ng/mL  Methodology: Mass Spectrometry   BUPRENORPHINE  Not Detected   Comment: INTERPRETIVE INFORMATION:Buprenorphine, U  Positive Cutoff: 5 ng/mL  Methodology: Mass Spectrometry   NORUBPRENORPHINE  Not Detected   Comment: INTERPRETIVE INFORMATION:Norbuprenorphine, U  Positive Cutoff: 20 ng/mL  Methodology:  Mass Spectrometry   FENTANYL  Not Detected   Comment: INTERPRETIVE INFORMATION:Fentanyl, U  Positive Cutoff: 2 ng/mL  Methodology: Mass Spectrometry   NORFENTANYL  Not Detected   Comment: INTERPRETIVE INFORMATION:Norfentanyl, U  Positive Cutoff: 2 ng/mL  Methodology: Mass Spectrometry   MEPERIDINE METABOLITE  Not Detected   Comment: INTERPRETIVE INFORMATION:Meperidine metabolite, U  Positive Cutoff: 50 ng/mL  Methodology: Mass Spectrometry   TAPENTADOL  Not Detected   Comment: INTERPRETIVE INFORMATION:Tapentadol, U  Positive Cutoff: 100 ng/mL  Methodology: Mass Spectrometry   TAPENTADOL-O-SULF  Not Detected   Comment: INTERPRETIVE INFORMATION:Tapentadol-o-Sulf, U  Positive Cutoff: 200 ng/mL  Methodology: Mass Spectrometry   METHADONE  Negative   Comment: Presumptive negative by immunoassay. Testing by mass  spectrometry is available on request.  INTERPRETIVE INFORMATION: Methadone Screen, U  Positive Cutoff: 150 ng/mL  Methodology: Immunoassay   TRAMADOL  Negative   Comment: Presumptive negative by immunoassay. Testing by mass  spectrometry is available on request.  INTERPRETIVE INFORMATION:Tramadol Screen, U  Positive Cutoff: 100 ng/mL  Methodology: Immunoassay   AMPHETAMINE  Not Detected   Comment: INTERPRETIVE INFORMATION:Amphetamine, U  Positive Cutoff: 50 ng/mL  Methodology: Mass Spectrometry   METHAMPHETAMINE  Not Detected   Comment: INTERPRETIVE INFORMATION:Methamphetamine, U  Positive Cutoff: 200 ng/mL  Methodology: Mass Spectrometry   MDMA- ECSTASY  Not Detected   Comment: INTERPRETIVE INFORMATION:MDMA, U  Positive Cutoff: 200 ng/mL  Methodology: Mass Spectrometry   MDA  Not Detected   Comment: INTERPRETIVE INFORMATION:MDA, U  Positive Cutoff: 200 ng/mL  Methodology: Mass Spectrometry   MDEA- Phuong  Not Detected   Comment: INTERPRETIVE INFORMATION:MDEA, U  Positive Cutoff: 200 ng/mL  Methodology: Mass Spectrometry   METHYLPHENIDATE  Not Detected   Comment: INTERPRETIVE INFORMATION:Methylphenidate, U  Positive  Cutoff: 100 ng/mL  Methodology: Mass Spectrometry   PHENTERMINE  Not Detected   Comment: INTERPRETIVE INFORMATION:Phentermine, U  Positive Cutoff: 100 ng/mL  Methodology: Mass Spectrometry   BENZOYLECGONINE  Negative   Comment: Presumptive negative by immunoassay. Testing by mass  spectrometry is available on request.  INTERPRETIVE INFORMATION:Cocaine Screen, U  Positive Cutoff: 150 ng/mL  Methodology: Immunoassay   ALPRAZOLAM  Not Detected   Comment: INTERPRETIVE INFORMATION:Alprazolam, U  Positive Cutoff: 40 ng/mL  Methodology: Mass Spectrometry   ALPHA-OH-ALPRAZOLAM  Not Detected   Comment: INTERPRETIVE INFORMATION:Alpha-OH-Alprazolam, U  Positive Cutoff: 20 ng/mL  Methodology: Mass Spectrometry   CLONAZEPAM  Not Detected   Comment: INTERPRETIVE INFORMATION:Clonazepam, U  Positive Cutoff: 20 ng/mL  Methodology: Mass Spectrometry   7-AMINOCLONAZEPAM  Not Detected   Comment: INTERPRETIVE INFORMATION:7-Aminoclonazepam, U  Positive Cutoff: 40 ng/mL  Methodology: Mass Spectrometry   DIAZEPAM  Not Detected   Comment: INTERPRETIVE INFORMATION:Diazepam, U  Positive Cutoff: 50 ng/mL  Methodology: Mass Spectrometry   NORDIAZEPAM  Not Detected   Comment: INTERPRETIVE INFORMATION:Nordiazepam, U  Positive Cutoff: 50 ng/mL  Methodology: Mass Spectrometry   OXAZEPAM  Not Detected   Comment: INTERPRETIVE INFORMATION:Oxazepam, U  Positive Cutoff: 50 ng/mL  Methodology: Mass Spectrometry   TEMAZEPAM  Not Detected   Comment: INTERPRETIVE INFORMATION:Temazepam, U  Positive Cutoff: 50 ng/mL  Methodology: Mass Spectrometry   Lorazepam  Not Detected   Comment: INTERPRETIVE INFORMATION:Lorazepam, U  Positive Cutoff: 60 ng/mL  Methodology: Mass Spectrometry   MIDAZOLAM  Not Detected   Comment: INTERPRETIVE INFORMATION:Midazolam, U  Positive Cutoff: 20 ng/mL  Methodology: Mass Spectrometry   ZOLPIDEM  Not Detected   Comment: INTERPRETIVE INFORMATION:Zolpidem, U  Positive Cutoff: 20 ng/mL  Methodology: Mass Spectrometry   BARBITURATES   Negative   Comment: Presumptive negative by immunoassay. Testing by mass  spectrometry is available on request.  INTERPRETIVE INFORMATION:Barbiturates Screen, U  Positive Cutoff: 200 ng/mL  Methodology: Immunoassay   Creatinine, Urine 20.0 - 400.0 mg/dL 186.5   ETHYL GLUCURONIDE  Negative   Comment: Presumptive negative by immunoassay. Testing by mass  spectrometry is available on request.  INTERPRETIVE INFORMATION:Ethyl Glucuronide Screen, U  Positive Cutoff: 500 ng/mL  Methodology: Immunoassay   MARIJUANA METABOLITE  Negative   Comment: Presumptive negative by immunoassay. Testing by mass  spectrometry is available on request.  INTERPRETIVE INFORMATION: THC (Cannabinoids) Screen, U  Positive Cutoff: 50 ng/mL  Methodology: Immunoassay   PCP  Negative   Comment: Presumptive negative by immunoassay. Testing by mass  spectrometry is available on request.  INTERPRETIVE INFORMATION:Phencyclidine Screen, U  Positive Cutoff: 25 ng/mL  Methodology: Immunoassay   CARISOPRODOL  Negative   Comment: Presumptive negative by immunoassay. Testing by mass  spectrometry is available on request.  INTERPRETIVE INFORMATION: Carisoprodol Screen, U  Positive Cutoff: 100 ng/mL  Methodology: Immunoassay  The carisoprodol immunoassay has cross-reactivity to  carisoprodol and meprobamate.   Naloxone  Not Detected   Comment: INTERPRETIVE INFORMATION:Naloxone, U  Positive Cutoff: 100 ng/mL  Methodology: Mass Spectrometry   Gabapentin  Not Detected   Comment: INTERPRETIVE INFORMATION:Gabapentin, U  Positive Cutoff: 3,000 ng/mL  Methodology: Mass Spectrometry   Pregabalin  Not Detected   Comment: INTERPRETIVE INFORMATION:Pregabalin, U  Positive Cutoff: 3,000 ng/mL  Methodology: Mass Spectrometry   Alpha-OH-Midazolam  Not Detected   Comment: INTERPRETIVE INFORMATION:Alpha-OH-Midazolam, U  Positive Cutoff: 20 ng/mL  Methodology: Mass Spectrometry   Zolpidem Metabolite  Not Detected   Comment: INTERPRETIVE INFORMATION:Zolpidem Metabolite,  U  Positive Cutoff: 100 ng/mL  Methodology: Mass Spectrometry   PAIN MANAGEMENT DRUG PANEL  See Below   Comment: Methodology: Qualitative Enzyme Immunoassay and Qualitative  Liquid Chromatography-Tandem Mass Spectrometry,  Quantitative Spectrophotometry  The absence of expected drug(s) and/or drug metabolite(s)  may indicate non-compliance, inappropriate timing of  specimen collection relative to drug administration, poor  drug absorption, diluted/adulterated urine, or limitations  of testing. The concentration must be greater than or equal  to the cutoff to be reported as present.  If specific drug  concentrations are required, contact the laboratory within  two weeks of specimen collection to request quantification  by a second analytical technique. Interpretive questions  should be directed to the laboratory.  Results based on immunoassay detection that do not match  clinical expectations should be  interpreted with caution. Confirmatory testing by mass  spectrometry for immunoassay-based results is available, if  ordered within two weeks of specimen collection. Additional  charges apply.  For medical purposes only; not valid for forensic use.  This test was developed and its performance characteristics  determined by [facility]. It has not been cleared or  approved by the US Food and Drug Administration. This test  was performed in a CLIA certified laboratory and is  intended for clinical purposes.   EER PAIN MGT [place],HIGH RES/EMIT, INTERP  See Note   Comment: Authorized individuals can access the [employer]  Enhanced Report using the following link:    [email or web address]  Performed By: [facility]  [address] [place] [id number]  : Leoncio Banda MD, PhD  CLIA Number: [id number]

## 2024-04-09 NOTE — TELEPHONE ENCOUNTER
----- Message from Lynne Dean sent at 4/9/2024  1:24 PM CDT -----  Regarding: rx refill  Type: RX Refill Request    Who Called:     Pharmacy Name:   [facility] #[id number] - [address] & [address] [address]  Phone: [phone] Fax: [phone]        Refill or New Rx:    RX Name and Strength: traMADoL (ULTRAM) 50 mg tablet    How is the patient currently taking it? (ex. 1XDay):    Is this a 30 day or 90 day RX:    Additional Notes:

## 2024-04-10 ENCOUNTER — TELEPHONE (OUTPATIENT)
Dept: PAIN MEDICINE | Facility: CLINIC | Age: 69
End: 2024-04-10
Payer: MEDICARE

## 2024-04-10 NOTE — TELEPHONE ENCOUNTER
Staff spoke with pt and verbalized to the patient his medication has been sent to his requested pharmacy.      ----- Message from Germania Ahn sent at 4/10/2024  3:04 PM CDT -----  Regarding: refill  Name of caller: phill       What is the requesting detail: Please resend traMADoL (ULTRAM) 50 mg tablet [id number] [address] & [facility]    Pt states pharmacy does not have the Rx on file. Please advise       Can the clinic reply by [facility]:       What number to call back: [phone]

## 2024-04-10 NOTE — TELEPHONE ENCOUNTER
----- Message from Germania Ahn sent at 4/10/2024  3:04 PM CDT -----  Regarding: refill  Name of caller: phill       What is the requesting detail: Please resend traMADoL (ULTRAM) 50 mg tablet to [facility] #[id number] - [address] & [facility] pharmacy does not have the Rx on file. Please advise       Can the clinic reply by [facility]:       What number to call back: [phone]

## 2024-04-10 NOTE — TELEPHONE ENCOUNTER
----- Message from Lynne Dean sent at 4/9/2024  1:24 PM CDT -----  Regarding: rx refill  Type: RX Refill Request    Who Called:     Pharmacy Name:   [facility] #[id number] - [address] & [address] [address]  Phone: [phone] Fax: [phone]        Refill or New Rx:    RX Name and Strength: traMADoL (ULTRAM) 50 mg tablet    How is the patient currently taking it? (ex. 1XDay):    Is this a 30 day or 90 day RX:    Additional Notes:

## 2024-04-10 NOTE — TELEPHONE ENCOUNTER
Staff spoke with patient and informed him that his medication was sent in on yesterday and should be at his pharmacy.     ----- Message from Lynne Dean sent at 4/9/2024  1:24 PM CDT -----  Regarding: rx refill  Type: RX Refill Request    Who Called:     Pharmacy Name:   [facility] #[id number] [address] [address]  Phone: [phone] Fax: [phone]        Refill or New Rx:    RX Name and Strength: traMADoL (ULTRAM) 50 mg tablet    How is the patient currently taking it? (ex. 1XDay):    Is this a 30 day or 90 day RX:    Additional Notes:

## 2025-04-21 NOTE — PROGRESS NOTES
"Name: Laurent Chong  MRN: [id number]   CSN: [id number]      Date: 04/22/2025    Referring physician:  Self, Aaareferral  No address on file    Chief Complaint: tremor    History of Present Illness (HPI): Laurent Chong is a R handed 69 y.o. male with a medical issues significant for HTN, HLD, hyperparathyroidism who presents for tremors. Tremors in both hands and legs, worse whenever he is anxious. He has had a tremor for at least 40 years ago. Tremor is worse in his R hand, notices it with posture, action and at rest. Previously didn't notice it much but now notices it when holding a plate. Sometimes feels off balance if his legs are shaky. One fall earlier this year, leg gave out. No major injuries. No difficulty with balance whenever he closes his eyes. Currently on metoprolol. He has never taken a medication for the tremor. Finds tremor to be embarrassing whenever he is eating out at a public place.     Does not consume etoh.     Family History: brother has a tremor, daughter has a tremor as well     Neuroleptic Exposure: none     Nonmotor/Premotor ROS:  Anosmia: "about 50/50"   Dysarthria/Hypophonia: none   Dysphagia/Sialorrhea: none   Depression: at times feels down   Urinary changes: none   Constipation: none   Orthostasis: sometimes feels lightheaded   Falls: as above   Freezing: none   Micrographia: shaky   Sleep issues:  -RBD: none     Review of Systems:   Review of Systems   Constitutional:  Negative for chills, fever and malaise/fatigue.   HENT:  Negative for hearing loss.    Eyes:  Negative for blurred vision and double vision.   Respiratory:  Negative for cough, shortness of breath and stridor.    Cardiovascular:  Negative for chest pain and leg swelling.   Gastrointestinal:  Negative for constipation, diarrhea and nausea.   Genitourinary:  Negative for frequency and urgency.   Musculoskeletal:  Positive for falls.   Skin:  Negative for itching and rash.   Neurological:  Positive for tremors. Negative for " "dizziness, loss of consciousness and weakness.   Psychiatric/Behavioral:  Negative for hallucinations and memory loss.            Past Medical History: The patient  has no past medical history on file.    Social History: The patient  reports that he has been smoking cigarettes. He has never used smokeless tobacco. He reports that he does not drink alcohol and does not use drugs.    Family History: Their family history is not on file.    Allergies: Patient has no known allergies.     Meds: Medications Ordered Prior to Encounter[1]    Exam:  BP (!) 155/76   Pulse 100   Ht 5' 8" (1.727 m)   Wt 96.6 kg (213 lb)   BMI 32.39 kg/m²     Constitutional  Well-developed, well-nourished, appears stated age   Ophthalmoscopic  No papilledema with no hemorrhages or exudates bilaterally   Cardiovascular  Radial pulses 2+ and symmetric, no LE edema bilaterally   Neurological    * Mental status  MOCA =      - Orientation  Oriented to person, place, time, and situation     - Memory   Intact recent and remote     - Attention/concentration  Attentive, vigilant during exam     - Language  Naming & repetition intact, +2-step commands     - Fund of knowledge  Aware of current events     - Executive  Well-organized thoughts     - Other     * Cranial nerves       - CN II  PERRL, visual fields full to confrontation     - CN III, IV, VI  Extraocular movements full, normal pursuits and saccades     - CN V  Sensation V1 - V3 intact     - CN VII  Face strong and symmetric bilaterally     - CN VIII  Hearing intact bilaterally     - CN IX, X  Palate raises midline and symmetric     - CN XI  SCM and trapezius 5/5 bilaterally     - CN XII  Tongue midline   * Motor  Muscle bulk normal, strength 5/5 throughout   * Sensory   Decreased vibratory sensation to bilateral LE    * Coordination  No dysmetria with finger-to-nose or heel-to-shin   * Gait  See below.   * Deep tendon reflexes  1+ and symmetric throughout    Trace patellar reflexes    Babinski " downgoing bilaterally   * Specialized movement exam  No hypophonic speech. -- nasally speech    No facial masking.   No cogwheel rigidity, difficulty relaxing    No bradykinesia.   Resting tremor of R > L hand    Bilateral postural and action tremor R > L    No other dystonia, chorea, athetosis, myoclonus, or tics.   No motor impersistence.   Normal-based gait.   No shortened stride length.   No abnormal arm swing.     Sways with romberg      Laboratory/Radiological:  - Results:  No visits with results within 3 Month(s) from this visit.   Latest known visit with results is:   Office Visit on 01/23/2024   Component Date Value Ref Range Status    PAIN MANAGEMENT DRUG PANEL INTERP 01/23/2024 See Note   Final    CODEINE 01/23/2024 Not Detected   Final    MORPHINE 01/23/2024 Not Detected   Final    6-ACETYLMORPHINE 01/23/2024 Not Detected   Final    OXYCODONE 01/23/2024 Not Detected   Final    NOROYXCODONE 01/23/2024 Not Detected   Final    OXYMORPHONE 01/23/2024 Not Detected   Final    NOROXYMORPHONE 01/23/2024 Not Detected   Final    HYDROCODONE 01/23/2024 Not Detected   Final    NORHYDROCODONE 01/23/2024 Not Detected   Final    HYDROMORPHONE 01/23/2024 Not Detected   Final    BUPRENORPHINE 01/23/2024 Not Detected   Final    NORUBPRENORPHINE 01/23/2024 Not Detected   Final    FENTANYL 01/23/2024 Not Detected   Final    NORFENTANYL 01/23/2024 Not Detected   Final    MEPERIDINE METABOLITE 01/23/2024 Not Detected   Final    TAPENTADOL 01/23/2024 Not Detected   Final    TAPENTADOL-O-SULF 01/23/2024 Not Detected   Final    METHADONE 01/23/2024 Negative   Final    TRAMADOL 01/23/2024 Negative   Final    AMPHETAMINE 01/23/2024 Not Detected   Final    METHAMPHETAMINE 01/23/2024 Not Detected   Final    MDMA- ECSTASY 01/23/2024 Not Detected   Final    MDA 01/23/2024 Not Detected   Final    MDEA- Phuong 01/23/2024 Not Detected   Final    METHYLPHENIDATE 01/23/2024 Not Detected   Final    PHENTERMINE 01/23/2024 Not Detected   Final     BENZOYLECGONINE 01/23/2024 Negative   Final    ALPRAZOLAM 01/23/2024 Not Detected   Final    ALPHA-OH-ALPRAZOLAM 01/23/2024 Not Detected   Final    CLONAZEPAM 01/23/2024 Not Detected   Final    7-AMINOCLONAZEPAM 01/23/2024 Not Detected   Final    DIAZEPAM 01/23/2024 Not Detected   Final    NORDIAZEPAM 01/23/2024 Not Detected   Final    OXAZEPAM 01/23/2024 Not Detected   Final    TEMAZEPAM 01/23/2024 Not Detected   Final    Lorazepam 01/23/2024 Not Detected   Final    MIDAZOLAM 01/23/2024 Not Detected   Final    ZOLPIDEM 01/23/2024 Not Detected   Final    BARBITURATES 01/23/2024 Negative   Final    Creatinine, Urine 01/23/2024 186.5  20.0 - 400.0 mg/dL Final    ETHYL GLUCURONIDE 01/23/2024 Negative   Final    MARIJUANA METABOLITE 01/23/2024 Negative   Final    PCP 01/23/2024 Negative   Final    CARISOPRODOL 01/23/2024 Negative   Final    Naloxone 01/23/2024 Not Detected   Final    Gabapentin 01/23/2024 Not Detected   Final    Pregabalin 01/23/2024 Not Detected   Final    Alpha-OH-Midazolam 01/23/2024 Not Detected   Final    Zolpidem Metabolite 01/23/2024 Not Detected   Final    PAIN MANAGEMENT DRUG PANEL 01/23/2024 See Below   Final    EER PAIN MGT [place],HIGH RES/EMIT, IN* 01/23/2024 See Note   Final       - Independent review of images:      - Independent review of consultant's notes: Dipp     ASSESSMENT/PLAN:  Tremor   - family history of tremors, 40 year+ tremor   - more bothersome in the past year or two   - no significant bradykinesia or cogwheel rigidity, continue to monitor   - currently on metoprolol, avoid propranolol  - rec'd trial of primidone     2. Imbalance   - trace patellar reflexes, decreased vibratory sensation  - labs as below         Orders Placed This Encounter    Vitamin B12 Deficiency Panel    TSH    Vitamin B1    Vitamin B6    Treponema Pallidium Antibodies IgG, IgM    Hemoglobin A1C    Protein Electrophoresis, Serum    IMMUNOFIXATION ELECTROPHORESIS, SERUM    primidone (MYSOLINE) 50 MG Tab          Follow up: 3 months RBR      This is a patient with a neurologic diagnosis whose overall, ongoing care is being managed and monitored by me and our Neurology clinic.   As such, since 2024,  is the appropriate add-on code to accompany the other E/M billing for this visit.      Collaborating Physician, Dr. Hernández, was available during today's encounter. Any change to plan along with cosign to appear in the EMR.              Hollie Ortiz PA-C   [facility]  Department of Neurology  Movement Disorders             [1]   Current Outpatient Medications on File Prior to Visit   Medication Sig Dispense Refill    amLODIPine (NORVASC) 10 MG tablet Take 10 mg by mouth.      atorvastatin (LIPITOR) 40 MG tablet Take 40 mg by mouth.      enalapril (VASOTEC) 10 MG tablet Take 10 mg by mouth.      fluticasone propionate (FLONASE) 50 mcg/actuation nasal spray 2 sprays by Nasal route.      ibuprofen (ADVIL,MOTRIN) 600 MG tablet Take 600 mg by mouth every 12 (twelve) hours.      metoprolol succinate (TOPROL-XL) 50 MG 24 hr tablet Take 50 mg by mouth.      naproxen (NAPROSYN) 500 MG tablet Take 1 tablet (500 mg total) by mouth daily as needed (pain). 30 tablet 0    pregabalin (LYRICA) 50 MG capsule Take 1 capsule (50 mg total) by mouth 2 (two) times daily. 60 capsule 2    [DISCONTINUED] diclofenac sodium (VOLTAREN) 1 % Gel Apply 2 g topically once daily. 1 each 2    [DISCONTINUED] LIDOcaine (LIDODERM) 5 % Place 1 patch onto the skin once daily. Remove & Discard patch within 12 hours or as directed by MD 10 patch 0    [DISCONTINUED] ondansetron (ZOFRAN-ODT) 4 MG TbDL Take 1 tablet (4 mg total) by mouth every 6 (six) hours as needed (nausea). 16 tablet 0    [DISCONTINUED] tamsulosin (FLOMAX) 0.4 mg Cap Take 1 capsule (0.4 mg total) by mouth once daily. for 7 days 7 capsule 0    [DISCONTINUED] traMADoL (ULTRAM) 50 mg tablet Take 1 tablet (50 mg total) by mouth every 24 hours as needed for Pain. 30 tablet 1      No current facility-administered medications on file prior to visit.

## 2025-04-22 ENCOUNTER — OFFICE VISIT (OUTPATIENT)
Dept: ENDOCRINOLOGY | Facility: CLINIC | Age: 70
End: 2025-04-22
Payer: MEDICARE

## 2025-04-22 ENCOUNTER — OFFICE VISIT (OUTPATIENT)
Facility: CLINIC | Age: 70
End: 2025-04-22
Payer: MEDICARE

## 2025-04-22 ENCOUNTER — LAB VISIT (OUTPATIENT)
Dept: LAB | Facility: HOSPITAL | Age: 70
End: 2025-04-22
Payer: MEDICARE

## 2025-04-22 ENCOUNTER — RESULTS FOLLOW-UP (OUTPATIENT)
Dept: ENDOCRINOLOGY | Facility: CLINIC | Age: 70
End: 2025-04-22
Payer: MEDICARE

## 2025-04-22 VITALS
HEART RATE: 99 BPM | SYSTOLIC BLOOD PRESSURE: 160 MMHG | BODY MASS INDEX: 32.55 KG/M2 | OXYGEN SATURATION: 96 % | WEIGHT: 214.75 LBS | DIASTOLIC BLOOD PRESSURE: 74 MMHG | HEIGHT: 68 IN

## 2025-04-22 VITALS
HEIGHT: 68 IN | WEIGHT: 213 LBS | HEART RATE: 100 BPM | SYSTOLIC BLOOD PRESSURE: 155 MMHG | DIASTOLIC BLOOD PRESSURE: 76 MMHG | BODY MASS INDEX: 32.28 KG/M2

## 2025-04-22 DIAGNOSIS — R73.9 HYPERGLYCEMIA, UNSPECIFIED: ICD-10-CM

## 2025-04-22 DIAGNOSIS — E55.9 VITAMIN D DEFICIENCY: ICD-10-CM

## 2025-04-22 DIAGNOSIS — E21.0 PRIMARY HYPERPARATHYROIDISM: ICD-10-CM

## 2025-04-22 DIAGNOSIS — E55.9 VITAMIN D DEFICIENCY: Primary | ICD-10-CM

## 2025-04-22 DIAGNOSIS — R26.89 IMBALANCE: ICD-10-CM

## 2025-04-22 DIAGNOSIS — R25.1 TREMOR: ICD-10-CM

## 2025-04-22 DIAGNOSIS — Z71.89 COUNSELING REGARDING GOALS OF CARE: ICD-10-CM

## 2025-04-22 DIAGNOSIS — G60.9 PERIPHERAL NEUROPATHY, IDIOPATHIC: ICD-10-CM

## 2025-04-22 DIAGNOSIS — E21.0 PRIMARY HYPERPARATHYROIDISM: Primary | ICD-10-CM

## 2025-04-22 DIAGNOSIS — R25.1 TREMOR: Primary | ICD-10-CM

## 2025-04-22 PROBLEM — I25.10 CORONARY ARTERY DISEASE INVOLVING NATIVE CORONARY ARTERY OF NATIVE HEART WITHOUT ANGINA PECTORIS: Status: ACTIVE | Noted: 2023-11-07

## 2025-04-22 PROBLEM — I10 ESSENTIAL HYPERTENSION: Status: ACTIVE | Noted: 2018-01-13

## 2025-04-22 PROBLEM — E78.2 MIXED HYPERLIPIDEMIA: Status: ACTIVE | Noted: 2023-10-11

## 2025-04-22 LAB
25(OH)D3+25(OH)D2 SERPL-MCNC: 17 NG/ML (ref 30–96)
ALBUMIN SERPL BCP-MCNC: 4.4 G/DL (ref 3.5–5.2)
ANION GAP (OHS): 10 MMOL/L (ref 8–16)
BUN SERPL-MCNC: 13 MG/DL (ref 8–23)
CALCIUM SERPL-MCNC: 10.8 MG/DL (ref 8.7–10.5)
CHLORIDE SERPL-SCNC: 112 MMOL/L (ref 95–110)
CO2 SERPL-SCNC: 20 MMOL/L (ref 23–29)
CREAT SERPL-MCNC: 0.9 MG/DL (ref 0.5–1.4)
EAG (OHS): 123 MG/DL (ref 68–131)
GFR SERPLBLD CREATININE-BSD FMLA CKD-EPI: >60 ML/MIN/1.73/M2
GLUCOSE SERPL-MCNC: 100 MG/DL (ref 70–110)
HBA1C MFR BLD: 5.9 % (ref 4–5.6)
PHOSPHATE SERPL-MCNC: 2.6 MG/DL (ref 2.7–4.5)
POTASSIUM SERPL-SCNC: 4 MMOL/L (ref 3.5–5.1)
PTH-INTACT SERPL-MCNC: 227.8 PG/ML (ref 9–77)
SODIUM SERPL-SCNC: 142 MMOL/L (ref 136–145)
T PALLIDUM IGG+IGM SER QL: NORMAL
TSH SERPL-ACNC: 1.54 UIU/ML (ref 0.4–4)

## 2025-04-22 PROCEDURE — 36415 COLL VENOUS BLD VENIPUNCTURE: CPT

## 2025-04-22 PROCEDURE — 99999 PR PBB SHADOW E&M-EST. PATIENT-LVL IV: CPT | Mod: PBBFAC,,, | Performed by: INTERNAL MEDICINE

## 2025-04-22 PROCEDURE — 86334 IMMUNOFIX E-PHORESIS SERUM: CPT

## 2025-04-22 PROCEDURE — 83036 HEMOGLOBIN GLYCOSYLATED A1C: CPT

## 2025-04-22 PROCEDURE — 99999 PR PBB SHADOW E&M-EST. PATIENT-LVL IV: CPT | Mod: PBBFAC,,, | Performed by: PHYSICIAN ASSISTANT

## 2025-04-22 PROCEDURE — G2211 COMPLEX E/M VISIT ADD ON: HCPCS | Mod: S$GLB,,, | Performed by: INTERNAL MEDICINE

## 2025-04-22 PROCEDURE — 84443 ASSAY THYROID STIM HORMONE: CPT

## 2025-04-22 PROCEDURE — 3077F SYST BP >= 140 MM HG: CPT | Mod: CPTII,S$GLB,, | Performed by: INTERNAL MEDICINE

## 2025-04-22 PROCEDURE — 3008F BODY MASS INDEX DOCD: CPT | Mod: CPTII,S$GLB,, | Performed by: INTERNAL MEDICINE

## 2025-04-22 PROCEDURE — 1159F MED LIST DOCD IN RCRD: CPT | Mod: CPTII,S$GLB,, | Performed by: PHYSICIAN ASSISTANT

## 2025-04-22 PROCEDURE — 1126F AMNT PAIN NOTED NONE PRSNT: CPT | Mod: CPTII,S$GLB,, | Performed by: PHYSICIAN ASSISTANT

## 2025-04-22 PROCEDURE — 82607 VITAMIN B-12: CPT

## 2025-04-22 PROCEDURE — 3008F BODY MASS INDEX DOCD: CPT | Mod: CPTII,S$GLB,, | Performed by: PHYSICIAN ASSISTANT

## 2025-04-22 PROCEDURE — 3077F SYST BP >= 140 MM HG: CPT | Mod: CPTII,S$GLB,, | Performed by: PHYSICIAN ASSISTANT

## 2025-04-22 PROCEDURE — 83970 ASSAY OF PARATHORMONE: CPT

## 2025-04-22 PROCEDURE — 80069 RENAL FUNCTION PANEL: CPT

## 2025-04-22 PROCEDURE — 82306 VITAMIN D 25 HYDROXY: CPT

## 2025-04-22 PROCEDURE — 86593 SYPHILIS TEST NON-TREP QUANT: CPT

## 2025-04-22 PROCEDURE — 3288F FALL RISK ASSESSMENT DOCD: CPT | Mod: CPTII,S$GLB,, | Performed by: INTERNAL MEDICINE

## 2025-04-22 PROCEDURE — 84207 ASSAY OF VITAMIN B-6: CPT

## 2025-04-22 PROCEDURE — 1160F RVW MEDS BY RX/DR IN RCRD: CPT | Mod: CPTII,S$GLB,, | Performed by: PHYSICIAN ASSISTANT

## 2025-04-22 PROCEDURE — 1101F PT FALLS ASSESS-DOCD LE1/YR: CPT | Mod: CPTII,S$GLB,, | Performed by: INTERNAL MEDICINE

## 2025-04-22 PROCEDURE — 1125F AMNT PAIN NOTED PAIN PRSNT: CPT | Mod: CPTII,S$GLB,, | Performed by: INTERNAL MEDICINE

## 2025-04-22 PROCEDURE — 3078F DIAST BP <80 MM HG: CPT | Mod: CPTII,S$GLB,, | Performed by: PHYSICIAN ASSISTANT

## 2025-04-22 PROCEDURE — 3288F FALL RISK ASSESSMENT DOCD: CPT | Mod: CPTII,S$GLB,, | Performed by: PHYSICIAN ASSISTANT

## 2025-04-22 PROCEDURE — 4010F ACE/ARB THERAPY RXD/TAKEN: CPT | Mod: CPTII,S$GLB,, | Performed by: INTERNAL MEDICINE

## 2025-04-22 PROCEDURE — 99204 OFFICE O/P NEW MOD 45 MIN: CPT | Mod: S$GLB,,, | Performed by: PHYSICIAN ASSISTANT

## 2025-04-22 PROCEDURE — 99204 OFFICE O/P NEW MOD 45 MIN: CPT | Mod: S$GLB,,, | Performed by: INTERNAL MEDICINE

## 2025-04-22 PROCEDURE — 84165 PROTEIN E-PHORESIS SERUM: CPT

## 2025-04-22 PROCEDURE — 1101F PT FALLS ASSESS-DOCD LE1/YR: CPT | Mod: CPTII,S$GLB,, | Performed by: PHYSICIAN ASSISTANT

## 2025-04-22 PROCEDURE — 84425 ASSAY OF VITAMIN B-1: CPT

## 2025-04-22 PROCEDURE — 3078F DIAST BP <80 MM HG: CPT | Mod: CPTII,S$GLB,, | Performed by: INTERNAL MEDICINE

## 2025-04-22 PROCEDURE — G2211 COMPLEX E/M VISIT ADD ON: HCPCS | Mod: S$GLB,,, | Performed by: PHYSICIAN ASSISTANT

## 2025-04-22 PROCEDURE — 84165 PROTEIN E-PHORESIS SERUM: CPT | Mod: 26,,, | Performed by: PATHOLOGY

## 2025-04-22 PROCEDURE — 4010F ACE/ARB THERAPY RXD/TAKEN: CPT | Mod: CPTII,S$GLB,, | Performed by: PHYSICIAN ASSISTANT

## 2025-04-22 PROCEDURE — 86340 INTRINSIC FACTOR ANTIBODY: CPT

## 2025-04-22 RX ORDER — PRIMIDONE 50 MG/1
50 TABLET ORAL 2 TIMES DAILY
Qty: 60 TABLET | Refills: 11 | Status: SHIPPED | OUTPATIENT
Start: 2025-04-22 | End: 2026-04-22

## 2025-04-22 RX ORDER — ATORVASTATIN CALCIUM 40 MG/1
40 TABLET, FILM COATED ORAL
COMMUNITY
Start: 2024-11-29

## 2025-04-22 RX ORDER — METOPROLOL SUCCINATE 50 MG/1
50 TABLET, EXTENDED RELEASE ORAL
COMMUNITY

## 2025-04-22 RX ORDER — IBUPROFEN 600 MG/1
600 TABLET ORAL EVERY 12 HOURS
COMMUNITY

## 2025-04-22 RX ORDER — FLUTICASONE PROPIONATE 50 MCG
2 SPRAY, SUSPENSION (ML) NASAL
COMMUNITY
Start: 2024-11-27

## 2025-04-22 RX ORDER — AMLODIPINE BESYLATE 10 MG/1
10 TABLET ORAL
COMMUNITY

## 2025-04-22 RX ORDER — ENALAPRIL MALEATE 10 MG/1
10 TABLET ORAL
COMMUNITY

## 2025-04-22 NOTE — ASSESSMENT & PLAN NOTE
Most likely a primary and secondary component  Nephrolithiasis    Calcium levels mildly elevated between 10 - 10.9 mg/dl   Vitamin D 17    PLAN:   Start vitamin D 2000 IUs daily  Repeat labs in six months with 24 hr urine ca/creat, PTH, RFP and vitamin D

## 2025-04-22 NOTE — PROGRESS NOTES
"ENDOCRINOLOGY INITIAL VISIT  04/22/2025    Reason for Visit:  referred by Selina German NP for evaluation of hyperparathyroidism    HPI:  Laurent Chong is a 69 y.o. male    With regards to the hypercalcemia and/or primary hyperparathyroidism:    Found to have elevated serum calcium dating back to 2018.  Highest recorded albumin-corrected calcium value was 11.2 in 2022  PTH first noted to be elevated 2021, 75 pg/ml.      Pertinent meds:  Calcium supplements: denies   Vitamin D supplements: denies     No   Yes  [x]    []  Thiazide and related diuretics  [x]    []  Lithium    Symptoms:    No   Yes  [x]    []  Polyuria/Polydipsia  []    [x]  Depression/Anxiety (very occasionally)  []    [x]  Mental Fog (very occasionally)  [x]    []  Constipation  [x]    []  Bone pain  [x]    []  >2" height loss      Indications for surgery:    No   Yes  []    [x]  Calcium > 11.2 mg/dL  [x]    []  Osteoporosis  []    [x]  Kidney stones or nephrocalcinosis (two years ago)  [x]    []  Elevated 24 hour urine calcium  [x]    []  GFR <60   [x]    []  Age <50    Parathyroid scan 2022:   FINDINGS: There is physiologic uptake in the myocardium, salivary glands and thyroid gland.  On delayed images, there is no focal, abnormal persistent uptake in the region of the parathyroid glands.  Delayed images demonstrate near-complete tracer washout of the thyroid.     CT scan 2020:  Subcentimeter nonobstructing stone in the right kidney.   No Prior DXAs    Lab Results   Component Value Date    .8 (H) 04/22/2025    YAHIEZZG27AT 17 (L) 04/22/2025    CALCIUM 10.8 (H) 04/22/2025    CALCIUM 9.6 11/05/2022    PHOS 2.6 (L) 04/22/2025    ALKPHOS 135 11/05/2022    TSH 1.544 04/22/2025         Review of patient's allergies indicates:  No Known Allergies    Current Medications[1]      ROS: see HPI       PHYSICAL EXAM  BP (!) 160/74 (BP Location: Right arm, Patient Position: Sitting) Comment: didnt take bp meds  Pulse 99   Ht 5' 8" (1.727 m)   Wt " 97.4 kg (214 lb 11.7 oz)   SpO2 96%   BMI 32.65 kg/m²   Wt Readings from Last 3 Encounters:   04/22/25 96.6 kg (213 lb)   04/22/25 97.4 kg (214 lb 11.7 oz)   04/08/24 99.2 kg (218 lb 11.1 oz)   ]    Constitutional:  Pleasant,  in no acute distress.   HENT:   Head:    Normocephalic and atraumatic.   Eyes:    EOMI. No scleral icterus.   Neck:    No thyromegaly or palpable thyroid nodules, no cervical LAD  Cardiovascular:  Normal rate, regular rhythm, 2+ radial pulses blx   Respiratory:   Effort normal   Gastrointestinal: Soft, nontender  Neurological:  No tremor, normal speech  Skin:    Skin is warm, dry  Psych:   Normal mood and affect.   Extremity:  No edema or wounds, no deformity       IMAGING STUDIES        ASSESSMENT/PLAN    Problem List Items Addressed This Visit          1 - High    Primary hyperparathyroidism    Most likely a primary and secondary component  Nephrolithiasis    Calcium levels mildly elevated between 10 - 10.9 mg/dl   Vitamin D 17    PLAN:   Start vitamin D 2000 IUs daily  Repeat labs in six months with 24 hr urine ca/creat, PTH, RFP and vitamin D          Relevant Orders    Creatinine, urine, timed    Calcium, Timed Urine    Vitamin D (Completed)    Renal Function Panel (Completed)    PTH, Intact (Completed)       2     Vitamin D deficiency - Primary       RTC 12 months     Jade Ashton MD         [1]   Current Outpatient Medications:     amLODIPine (NORVASC) 10 MG tablet, Take 10 mg by mouth., Disp: , Rfl:     atorvastatin (LIPITOR) 40 MG tablet, Take 40 mg by mouth., Disp: , Rfl:     enalapril (VASOTEC) 10 MG tablet, Take 10 mg by mouth., Disp: , Rfl:     fluticasone propionate (FLONASE) 50 mcg/actuation nasal spray, 2 sprays by Nasal route., Disp: , Rfl:     ibuprofen (ADVIL,MOTRIN) 600 MG tablet, Take 600 mg by mouth every 12 (twelve) hours., Disp: , Rfl:     metoprolol succinate (TOPROL-XL) 50 MG 24 hr tablet, Take 50 mg by mouth., Disp: , Rfl:     naproxen (NAPROSYN) 500 MG tablet,  Take 1 tablet (500 mg total) by mouth daily as needed (pain)., Disp: 30 tablet, Rfl: 0    pregabalin (LYRICA) 50 MG capsule, Take 1 capsule (50 mg total) by mouth 2 (two) times daily., Disp: 60 capsule, Rfl: 2    primidone (MYSOLINE) 50 MG Tab, Take 1 tablet (50 mg total) by mouth 2 (two) times a day., Disp: 60 tablet, Rfl: 11

## 2025-04-22 NOTE — PATIENT INSTRUCTIONS
Few steps to our plan:     1) vitamin D 2000 IUs daily   2) in 4 - 6 months we will collect a 24 hr urine for calcium and labs     I have reviewed your labs.     For the treatment of high parathyroid hormone levels (hyperparathyroidism) we recommend the followin) drink plenty of fluids, your urine should be light colored.   2) take a multivitamin daily, but avoid other calcium supplements. Calcium should preferably come from your diet, such as dark leafy greens, broccoli, and dairy.   3) take a daily vitamin D supplement, 2000 IUs daily  4) we will check your bone density with a DXA scan     Please let me know if you sustain a bad fall or fracture or a kidney stone.     HOW TO COLLECT AN ACCURATE   24 HOUR URINE SPECIMEN     I want you to collect EVERY drop of your urine during a 24 hour period. I do not care what the volume of the urine is as long as it represents every drop that you pass during that 24 hour period. If you need to have a bowel movement, you may separate the urine but I want every drop.     Begin the collection on a morning which is convenient for you. At that time, pass your urine, flush it down the toilet and note the exact time. Now you have an empty bladder and empty bottle. That starts the collection. Collect every drop all during the day and night until exactly the same time the next morning, when you should pass your urine and add it to the bottle.     Bring the closed container back to the same laboratory that issued the empty container.      Estimated Calcium Content of Foods:  Produce  Serving Size Estimated Calcium*    Samy greens, frozen 8 oz 360 mg   Broccoli shaheen 8 oz 200 mg   Kale, frozen 8 oz 180 mg   Soy Beans, green, boiled 8 oz 175 mg   Bok [place]io, cooked, boiled 8 oz 160 mg   Figs, dried 2 figs 65 mg   Broccoli, fresh, cooked 8 oz 60 mg   Oranges 1 whole 55 mg   Seafood Serving Size Estimated Calcium*    Sardines, canned with bones 3 oz 325 mg   [place], canned with bones 3  oz 180 mg   Shrimp, canned 3 oz 125 mg   Dairy Serving Size Estimated Calcium*    Ricotta, part-skim 4 oz 335 mg   Yogurt, plain, low-fat 6 oz 310 mg   Milk, skim, low-fat, whole 8 oz 300 mg   Yogurt with fruit, low-fat 6 oz 260 mg   Mozzarella, part-skim 1 oz 210 mg   Cheddar 1 oz 205 mg   Yogurt, Greek 6 oz 200 mg   American Cheese 1 oz 195 mg   Feta Cheese 4 oz 140 mg   Cottage Cheese, 2% 4 oz 105 mg   Frozen yogurt, vanilla 8 oz 105 mg   Ice Cream, vanilla 8 oz 85 mg   Parmesan 1 tbsp 55 mg   Fortified Food Serving Size Estimated Calcium*   [place] milk, rice milk or soy milk, fortified 8 oz 300 mg   Orange juice and other fruit juices, fortified 8 oz 300 mg   Tofu, prepared with calcium 4 oz 205 mg   Waffle, frozen, fortified 2 pieces 200 mg   Oatmeal, fortified 1 packet 140 mg   English muffin, fortified 1 muffin 100 mg   Cereal, fortified 8 oz 100-1,000 mg   Other Serving Size Estimated Calcium*   Mac & cheese, frozen 1 package 325 mg   Pizza, cheese, frozen 1 serving 115 mg   Pudding, chocolate, prepared with 2% milk 4 oz 160 mg   Beans, baked, canned 4 oz 160 mg   *The calcium content listed for most foods is estimated and can vary due to multiple factors. Check the food label to determine how much calcium is in a particular product.  If you read the nutrition label for a food source, it lists the % calcium in that food.  For an 8 oz glass of milk, for example, the label states calcium 30%.  This is equivalent to 300 mg of calcium (multiply the listed number by 10).   **Table from the [employer]

## 2025-04-23 LAB
ALBUMIN, SPE (OHS): 4.54 G/DL (ref 3.35–5.55)
ALPHA 1 GLOB (OHS): 0.29 GM/DL (ref 0.17–0.41)
ALPHA 2 GLOB (OHS): 0.76 GM/DL (ref 0.43–0.99)
BETA GLOB (OHS): 0.89 GM/DL (ref 0.5–1.1)
GAMMA GLOBULIN (OHS): 1.12 GM/DL (ref 0.67–1.58)
PATHOLOGIST INTERPRETATION - IFE SERUM (OHS): NORMAL
PATHOLOGIST REVIEW - SPE (OHS): NORMAL
PROT SERPL-MCNC: 7.6 GM/DL (ref 6–8.4)

## 2025-04-24 ENCOUNTER — PATIENT MESSAGE (OUTPATIENT)
Facility: CLINIC | Age: 70
End: 2025-04-24
Payer: MEDICARE

## 2025-04-24 ENCOUNTER — RESULTS FOLLOW-UP (OUTPATIENT)
Facility: CLINIC | Age: 70
End: 2025-04-24

## 2025-04-24 DIAGNOSIS — D51.0 PERNICIOUS ANEMIA: Primary | ICD-10-CM

## 2025-04-24 LAB
ANNOTATION COMMENT IMP: ABNORMAL
IF BLOCK AB SER QL: POSITIVE
VIT B12 SERPL-MCNC: 86 NG/L (ref 180–914)

## 2025-04-24 RX ORDER — CYANOCOBALAMIN 1000 UG/ML
INJECTION, SOLUTION INTRAMUSCULAR; SUBCUTANEOUS
Qty: 10 ML | Refills: 2 | Status: SHIPPED | OUTPATIENT
Start: 2025-04-24

## 2025-04-28 LAB — W VITAMIN B6: 4 UG/L

## 2025-04-29 ENCOUNTER — TELEPHONE (OUTPATIENT)
Facility: CLINIC | Age: 70
End: 2025-04-29
Payer: MEDICARE

## 2025-04-29 NOTE — TELEPHONE ENCOUNTER
[facility] Staff  Name of Patient: Laurent Chogn [[id number]]      What is the request in detail:  Would like to speak with staff in regards to reason Gastro referral was placed. Please assist      Can the clinic reply by [facility]: no      What Number to Call Back if not in [facility]:  [phone]

## 2025-05-01 ENCOUNTER — PATIENT MESSAGE (OUTPATIENT)
Facility: CLINIC | Age: 70
End: 2025-05-01
Payer: MEDICARE

## 2025-05-01 DIAGNOSIS — R25.1 TREMOR: ICD-10-CM

## 2025-05-01 DIAGNOSIS — E53.8 B12 DEFICIENCY: ICD-10-CM

## 2025-05-01 DIAGNOSIS — D51.0 PERNICIOUS ANEMIA: Primary | ICD-10-CM

## 2025-05-01 RX ORDER — CYANOCOBALAMIN 1000 UG/ML
INJECTION, SOLUTION INTRAMUSCULAR; SUBCUTANEOUS
Qty: 10 ML | Refills: 2 | Status: SHIPPED | OUTPATIENT
Start: 2025-05-01

## 2025-05-01 NOTE — TELEPHONE ENCOUNTER
Called patient re: lab results. He is seeing GI for pernicious anemia  Educated to begin a B complex. He verbalizes understanding.  He recently started a multivitamin. Rec. He get a B complex if MVI does not contain B6 and B12.  Discussed B12 injections. Patient would like primary care to administer the injections. Will ask RR to route to:      [facility] Phone: [phone]

## 2025-06-02 ENCOUNTER — TELEPHONE (OUTPATIENT)
Dept: GASTROENTEROLOGY | Facility: CLINIC | Age: 70
End: 2025-06-02
Payer: MEDICARE

## 2025-06-04 ENCOUNTER — OFFICE VISIT (OUTPATIENT)
Dept: GASTROENTEROLOGY | Facility: CLINIC | Age: 70
End: 2025-06-04
Payer: MEDICARE

## 2025-06-04 VITALS
WEIGHT: 212.75 LBS | SYSTOLIC BLOOD PRESSURE: 174 MMHG | HEART RATE: 86 BPM | BODY MASS INDEX: 32.24 KG/M2 | DIASTOLIC BLOOD PRESSURE: 91 MMHG | HEIGHT: 68 IN

## 2025-06-04 DIAGNOSIS — R14.0 BLOATING: ICD-10-CM

## 2025-06-04 DIAGNOSIS — D51.0 PERNICIOUS ANEMIA: Primary | ICD-10-CM

## 2025-06-04 PROCEDURE — 3288F FALL RISK ASSESSMENT DOCD: CPT | Mod: CPTII,S$GLB,,

## 2025-06-04 PROCEDURE — 3044F HG A1C LEVEL LT 7.0%: CPT | Mod: CPTII,S$GLB,,

## 2025-06-04 PROCEDURE — 1101F PT FALLS ASSESS-DOCD LE1/YR: CPT | Mod: CPTII,S$GLB,,

## 2025-06-04 PROCEDURE — 3008F BODY MASS INDEX DOCD: CPT | Mod: CPTII,S$GLB,,

## 2025-06-04 PROCEDURE — 4010F ACE/ARB THERAPY RXD/TAKEN: CPT | Mod: CPTII,S$GLB,,

## 2025-06-04 PROCEDURE — 3080F DIAST BP >= 90 MM HG: CPT | Mod: CPTII,S$GLB,,

## 2025-06-04 PROCEDURE — 99999 PR PBB SHADOW E&M-EST. PATIENT-LVL IV: CPT | Mod: PBBFAC,,,

## 2025-06-04 PROCEDURE — 1160F RVW MEDS BY RX/DR IN RCRD: CPT | Mod: CPTII,S$GLB,,

## 2025-06-04 PROCEDURE — 99204 OFFICE O/P NEW MOD 45 MIN: CPT | Mod: S$GLB,,,

## 2025-06-04 PROCEDURE — 3077F SYST BP >= 140 MM HG: CPT | Mod: CPTII,S$GLB,,

## 2025-06-04 PROCEDURE — 1125F AMNT PAIN NOTED PAIN PRSNT: CPT | Mod: CPTII,S$GLB,,

## 2025-06-04 PROCEDURE — 1159F MED LIST DOCD IN RCRD: CPT | Mod: CPTII,S$GLB,,

## 2025-08-21 ENCOUNTER — OFFICE VISIT (OUTPATIENT)
Facility: CLINIC | Age: 70
End: 2025-08-21
Payer: MEDICARE

## 2025-08-21 VITALS
HEART RATE: 91 BPM | DIASTOLIC BLOOD PRESSURE: 82 MMHG | WEIGHT: 209 LBS | SYSTOLIC BLOOD PRESSURE: 154 MMHG | BODY MASS INDEX: 31.67 KG/M2 | HEIGHT: 68 IN

## 2025-08-21 DIAGNOSIS — G60.9 PERIPHERAL NEUROPATHY, IDIOPATHIC: ICD-10-CM

## 2025-08-21 DIAGNOSIS — R26.89 IMBALANCE: ICD-10-CM

## 2025-08-21 DIAGNOSIS — Z71.89 COUNSELING REGARDING GOALS OF CARE: ICD-10-CM

## 2025-08-21 DIAGNOSIS — R25.1 TREMOR: Primary | ICD-10-CM

## 2025-08-21 DIAGNOSIS — D51.0 PERNICIOUS ANEMIA: ICD-10-CM

## 2025-08-21 DIAGNOSIS — E53.8 B12 DEFICIENCY: ICD-10-CM

## 2025-08-21 PROCEDURE — 99999 PR PBB SHADOW E&M-EST. PATIENT-LVL III: CPT | Mod: PBBFAC,,, | Performed by: PHYSICIAN ASSISTANT

## 2025-08-22 ENCOUNTER — LAB VISIT (OUTPATIENT)
Dept: LAB | Facility: HOSPITAL | Age: 70
End: 2025-08-22
Attending: INTERNAL MEDICINE
Payer: MEDICARE

## 2025-08-22 DIAGNOSIS — R25.1 TREMOR: ICD-10-CM

## 2025-08-22 DIAGNOSIS — E53.8 B12 DEFICIENCY: ICD-10-CM

## 2025-08-22 LAB — VIT B12 SERPL-MCNC: 323 PG/ML (ref 210–950)

## 2025-08-22 PROCEDURE — 36415 COLL VENOUS BLD VENIPUNCTURE: CPT | Mod: PN

## 2025-08-22 PROCEDURE — 82607 VITAMIN B-12: CPT
